# Patient Record
Sex: MALE | Race: OTHER | HISPANIC OR LATINO
[De-identification: names, ages, dates, MRNs, and addresses within clinical notes are randomized per-mention and may not be internally consistent; named-entity substitution may affect disease eponyms.]

---

## 2018-06-10 PROBLEM — Z00.00 ENCOUNTER FOR PREVENTIVE HEALTH EXAMINATION: Status: ACTIVE | Noted: 2018-06-10

## 2018-07-11 ENCOUNTER — APPOINTMENT (OUTPATIENT)
Dept: GASTROENTEROLOGY | Facility: CLINIC | Age: 58
End: 2018-07-11
Payer: MEDICAID

## 2018-07-11 VITALS
RESPIRATION RATE: 16 BRPM | HEIGHT: 64 IN | WEIGHT: 150 LBS | DIASTOLIC BLOOD PRESSURE: 85 MMHG | OXYGEN SATURATION: 96 % | BODY MASS INDEX: 25.61 KG/M2 | HEART RATE: 77 BPM | SYSTOLIC BLOOD PRESSURE: 118 MMHG

## 2018-07-11 DIAGNOSIS — L29.0 PRURITUS ANI: ICD-10-CM

## 2018-07-11 DIAGNOSIS — R12 HEARTBURN: ICD-10-CM

## 2018-07-11 PROCEDURE — 99214 OFFICE O/P EST MOD 30 MIN: CPT

## 2018-07-11 RX ORDER — OMEPRAZOLE 40 MG/1
40 CAPSULE, DELAYED RELEASE ORAL
Qty: 90 | Refills: 1 | Status: ACTIVE | COMMUNITY
Start: 2018-07-11 | End: 1900-01-01

## 2018-07-16 LAB
ALBUMIN SERPL ELPH-MCNC: 4.6 G/DL
ALP BLD-CCNC: 70 U/L
ALT SERPL-CCNC: 42 U/L
ANION GAP SERPL CALC-SCNC: 10 MMOL/L
AST SERPL-CCNC: 19 U/L
BILIRUB SERPL-MCNC: 0.9 MG/DL
BUN SERPL-MCNC: 22 MG/DL
CALCIUM SERPL-MCNC: 9.6 MG/DL
CHLORIDE SERPL-SCNC: 103 MMOL/L
CO2 SERPL-SCNC: 26 MMOL/L
CREAT SERPL-MCNC: 0.95 MG/DL
GLUCOSE SERPL-MCNC: 109 MG/DL
POTASSIUM SERPL-SCNC: 4.7 MMOL/L
PROT SERPL-MCNC: 7.4 G/DL
SODIUM SERPL-SCNC: 139 MMOL/L

## 2018-07-17 LAB
BASOPHILS # BLD AUTO: 0.01 K/UL
BASOPHILS NFR BLD AUTO: 0.2 %
EOSINOPHIL # BLD AUTO: 0.11 K/UL
EOSINOPHIL NFR BLD AUTO: 2.2 %
HCT VFR BLD CALC: 43.1 %
HGB BLD-MCNC: 14.9 G/DL
IMM GRANULOCYTES NFR BLD AUTO: 0.2 %
INR PPP: 0.93 RATIO
LYMPHOCYTES # BLD AUTO: 2.02 K/UL
LYMPHOCYTES NFR BLD AUTO: 40.9 %
MAN DIFF?: NORMAL
MCHC RBC-ENTMCNC: 32.4 PG
MCHC RBC-ENTMCNC: 34.6 GM/DL
MCV RBC AUTO: 93.7 FL
MONOCYTES # BLD AUTO: 0.44 K/UL
MONOCYTES NFR BLD AUTO: 8.9 %
NEUTROPHILS # BLD AUTO: 2.35 K/UL
NEUTROPHILS NFR BLD AUTO: 47.6 %
PLATELET # BLD AUTO: 157 K/UL
PT BLD: 10.5 SEC
RBC # BLD: 4.6 M/UL
RBC # FLD: 14.4 %
WBC # FLD AUTO: 4.94 K/UL

## 2018-09-13 ENCOUNTER — RESULT REVIEW (OUTPATIENT)
Age: 58
End: 2018-09-13

## 2018-09-13 ENCOUNTER — OUTPATIENT (OUTPATIENT)
Dept: OUTPATIENT SERVICES | Facility: HOSPITAL | Age: 58
LOS: 1 days | End: 2018-09-13
Payer: MEDICAID

## 2018-09-13 ENCOUNTER — APPOINTMENT (OUTPATIENT)
Dept: GASTROENTEROLOGY | Facility: GI CENTER | Age: 58
End: 2018-09-13
Payer: MEDICAID

## 2018-09-13 DIAGNOSIS — Z86.19 PERSONAL HISTORY OF OTHER INFECTIOUS AND PARASITIC DISEASES: ICD-10-CM

## 2018-09-13 DIAGNOSIS — R12 HEARTBURN: ICD-10-CM

## 2018-09-13 DIAGNOSIS — K22.70 BARRETT'S ESOPHAGUS W/OUT DYSPLASIA: ICD-10-CM

## 2018-09-13 PROCEDURE — 43239 EGD BIOPSY SINGLE/MULTIPLE: CPT

## 2018-09-13 PROCEDURE — 88305 TISSUE EXAM BY PATHOLOGIST: CPT

## 2018-09-13 PROCEDURE — T1013: CPT

## 2018-09-13 PROCEDURE — 88342 IMHCHEM/IMCYTCHM 1ST ANTB: CPT

## 2018-09-13 PROCEDURE — 88305 TISSUE EXAM BY PATHOLOGIST: CPT | Mod: 26

## 2018-09-13 PROCEDURE — 88342 IMHCHEM/IMCYTCHM 1ST ANTB: CPT | Mod: 26

## 2018-09-18 LAB — SURGICAL PATHOLOGY FINAL REPORT - CH: SIGNIFICANT CHANGE UP

## 2018-10-02 ENCOUNTER — RESULT REVIEW (OUTPATIENT)
Age: 58
End: 2018-10-02

## 2019-02-12 ENCOUNTER — APPOINTMENT (OUTPATIENT)
Dept: SPINE | Facility: CLINIC | Age: 59
End: 2019-02-12
Payer: MEDICAID

## 2019-02-12 DIAGNOSIS — Z83.3 FAMILY HISTORY OF DIABETES MELLITUS: ICD-10-CM

## 2019-02-12 PROCEDURE — 99203 OFFICE O/P NEW LOW 30 MIN: CPT

## 2019-02-15 VITALS — BODY MASS INDEX: 25.61 KG/M2 | HEIGHT: 64 IN | WEIGHT: 150 LBS

## 2019-02-15 PROBLEM — Z83.3 FAMILY HISTORY OF DIABETES MELLITUS: Status: ACTIVE | Noted: 2019-02-15

## 2019-02-15 NOTE — ASSESSMENT
[FreeTextEntry1] : Assess\par REcurrent cervical radiculopathy\par S/P ACDF 2011\par Now with C 8 nerve root pain \par \par PLAN:\par CT myelogram\par Follow up after images completed

## 2019-02-15 NOTE — REVIEW OF SYSTEMS
[Arm Weakness] : arm weakness [Hand Weakness] :  hand weakness [Joint Pain] : joint pain [Itching] : itching [Negative] : Heme/Lymph [de-identified] : neck and arm pain  [FreeTextEntry3] : blurry vision

## 2019-02-15 NOTE — HISTORY OF PRESENT ILLNESS
[FreeTextEntry1] : Neck pain  [de-identified] : This is a 58 year old male who had an ACDF in 2011 and presents now with neck pain  and right arm pain.  The pain started January 4, 2019 and her  reports pain in his right arm and numb finger tips.  The pain is a 4/10.  His shoulders are also involved and the  shoulder pain is explained as a burning sensation.

## 2019-02-15 NOTE — HISTORY OF PRESENT ILLNESS
[FreeTextEntry1] : Neck pain  [de-identified] : This is a 58 year old male who had an ACDF in 2011 and presents now with neck pain  and right arm pain.  The pain started January 4, 2019 and her  reports pain in his right arm and numb finger tips.  The pain is a 4/10.  His shoulders are also involved and the  shoulder pain is explained as a burning sensation.

## 2019-02-15 NOTE — REASON FOR VISIT
[New Patient Visit] : a new patient visit [Other: _____] : [unfilled] [FreeTextEntry1] : Recurrent cervical radiculopathy

## 2019-02-25 ENCOUNTER — OTHER (OUTPATIENT)
Age: 59
End: 2019-02-25

## 2019-02-26 ENCOUNTER — APPOINTMENT (OUTPATIENT)
Dept: SPINE | Facility: CLINIC | Age: 59
End: 2019-02-26

## 2019-02-28 ENCOUNTER — OUTPATIENT (OUTPATIENT)
Dept: OUTPATIENT SERVICES | Facility: HOSPITAL | Age: 59
LOS: 1 days | End: 2019-02-28
Payer: MEDICAID

## 2019-02-28 ENCOUNTER — APPOINTMENT (OUTPATIENT)
Dept: RADIOLOGY | Facility: HOSPITAL | Age: 59
End: 2019-02-28

## 2019-02-28 DIAGNOSIS — M54.12 RADICULOPATHY, CERVICAL REGION: ICD-10-CM

## 2019-02-28 DIAGNOSIS — Z00.00 ENCOUNTER FOR GENERAL ADULT MEDICAL EXAMINATION WITHOUT ABNORMAL FINDINGS: ICD-10-CM

## 2019-02-28 PROCEDURE — 72126 CT NECK SPINE W/DYE: CPT | Mod: 26

## 2019-02-28 PROCEDURE — 72126 CT NECK SPINE W/DYE: CPT

## 2019-02-28 PROCEDURE — 62302 MYELOGRAPHY LUMBAR INJECTION: CPT

## 2019-03-12 ENCOUNTER — APPOINTMENT (OUTPATIENT)
Dept: SPINE | Facility: CLINIC | Age: 59
End: 2019-03-12
Payer: MEDICAID

## 2019-03-12 PROCEDURE — 99214 OFFICE O/P EST MOD 30 MIN: CPT

## 2019-03-14 VITALS
WEIGHT: 150 LBS | SYSTOLIC BLOOD PRESSURE: 150 MMHG | DIASTOLIC BLOOD PRESSURE: 80 MMHG | HEIGHT: 64 IN | BODY MASS INDEX: 25.61 KG/M2

## 2019-03-14 NOTE — ASSESSMENT
[FreeTextEntry1] : Assess:\par No fusion across prior ACDF intervention\par Neck and right arm pain  \par \par PLAN\par Redo ACDF \par Collaborate with Dr Cortés in OR\par Risks discussed \par

## 2019-03-14 NOTE — REASON FOR VISIT
[Follow-Up: _____] : a [unfilled] follow-up visit [FreeTextEntry1] : 58 year old with history of neck and right arm pain.  CT myelogram performed reveals no fusion from prior surgery and stenosis.

## 2019-03-28 ENCOUNTER — OUTPATIENT (OUTPATIENT)
Dept: OUTPATIENT SERVICES | Facility: HOSPITAL | Age: 59
LOS: 1 days | End: 2019-03-28
Payer: MEDICAID

## 2019-03-28 VITALS
SYSTOLIC BLOOD PRESSURE: 131 MMHG | DIASTOLIC BLOOD PRESSURE: 88 MMHG | HEIGHT: 64 IN | TEMPERATURE: 97 F | HEART RATE: 82 BPM | WEIGHT: 151.9 LBS | OXYGEN SATURATION: 97 % | RESPIRATION RATE: 13 BRPM

## 2019-03-28 DIAGNOSIS — Z01.818 ENCOUNTER FOR OTHER PREPROCEDURAL EXAMINATION: ICD-10-CM

## 2019-03-28 DIAGNOSIS — Z78.9 OTHER SPECIFIED HEALTH STATUS: ICD-10-CM

## 2019-03-28 DIAGNOSIS — M54.12 RADICULOPATHY, CERVICAL REGION: ICD-10-CM

## 2019-03-28 DIAGNOSIS — K21.9 GASTRO-ESOPHAGEAL REFLUX DISEASE WITHOUT ESOPHAGITIS: ICD-10-CM

## 2019-03-28 DIAGNOSIS — Z29.9 ENCOUNTER FOR PROPHYLACTIC MEASURES, UNSPECIFIED: ICD-10-CM

## 2019-03-28 DIAGNOSIS — Z98.890 OTHER SPECIFIED POSTPROCEDURAL STATES: Chronic | ICD-10-CM

## 2019-03-28 LAB
ANION GAP SERPL CALC-SCNC: 14 MMOL/L — SIGNIFICANT CHANGE UP (ref 5–17)
BLD GP AB SCN SERPL QL: NEGATIVE — SIGNIFICANT CHANGE UP
BUN SERPL-MCNC: 19 MG/DL — SIGNIFICANT CHANGE UP (ref 7–23)
CALCIUM SERPL-MCNC: 10 MG/DL — SIGNIFICANT CHANGE UP (ref 8.4–10.5)
CHLORIDE SERPL-SCNC: 101 MMOL/L — SIGNIFICANT CHANGE UP (ref 96–108)
CO2 SERPL-SCNC: 24 MMOL/L — SIGNIFICANT CHANGE UP (ref 22–31)
CREAT SERPL-MCNC: 0.81 MG/DL — SIGNIFICANT CHANGE UP (ref 0.5–1.3)
GLUCOSE SERPL-MCNC: 105 MG/DL — HIGH (ref 70–99)
HCT VFR BLD CALC: 45.1 % — SIGNIFICANT CHANGE UP (ref 39–50)
HGB BLD-MCNC: 15 G/DL — SIGNIFICANT CHANGE UP (ref 13–17)
MCHC RBC-ENTMCNC: 31.5 PG — SIGNIFICANT CHANGE UP (ref 27–34)
MCHC RBC-ENTMCNC: 33.3 GM/DL — SIGNIFICANT CHANGE UP (ref 32–36)
MCV RBC AUTO: 94.7 FL — SIGNIFICANT CHANGE UP (ref 80–100)
MRSA PCR RESULT.: SIGNIFICANT CHANGE UP
PLATELET # BLD AUTO: 168 K/UL — SIGNIFICANT CHANGE UP (ref 150–400)
POTASSIUM SERPL-MCNC: 4.2 MMOL/L — SIGNIFICANT CHANGE UP (ref 3.5–5.3)
POTASSIUM SERPL-SCNC: 4.2 MMOL/L — SIGNIFICANT CHANGE UP (ref 3.5–5.3)
RBC # BLD: 4.76 M/UL — SIGNIFICANT CHANGE UP (ref 4.2–5.8)
RBC # FLD: 12.9 % — SIGNIFICANT CHANGE UP (ref 10.3–14.5)
RH IG SCN BLD-IMP: POSITIVE — SIGNIFICANT CHANGE UP
S AUREUS DNA NOSE QL NAA+PROBE: SIGNIFICANT CHANGE UP
SODIUM SERPL-SCNC: 139 MMOL/L — SIGNIFICANT CHANGE UP (ref 135–145)
WBC # BLD: 5.29 K/UL — SIGNIFICANT CHANGE UP (ref 3.8–10.5)
WBC # FLD AUTO: 5.29 K/UL — SIGNIFICANT CHANGE UP (ref 3.8–10.5)

## 2019-03-28 PROCEDURE — 86901 BLOOD TYPING SEROLOGIC RH(D): CPT

## 2019-03-28 PROCEDURE — 85027 COMPLETE CBC AUTOMATED: CPT

## 2019-03-28 PROCEDURE — G0463: CPT

## 2019-03-28 PROCEDURE — 86900 BLOOD TYPING SEROLOGIC ABO: CPT

## 2019-03-28 PROCEDURE — 87641 MR-STAPH DNA AMP PROBE: CPT

## 2019-03-28 PROCEDURE — 80048 BASIC METABOLIC PNL TOTAL CA: CPT

## 2019-03-28 PROCEDURE — 86850 RBC ANTIBODY SCREEN: CPT

## 2019-03-28 PROCEDURE — 87640 STAPH A DNA AMP PROBE: CPT

## 2019-03-28 RX ORDER — CEFAZOLIN SODIUM 1 G
2000 VIAL (EA) INJECTION ONCE
Qty: 0 | Refills: 0 | Status: DISCONTINUED | OUTPATIENT
Start: 2019-04-11 | End: 2019-04-14

## 2019-03-28 NOTE — H&P PST ADULT - NSANTHOSAYNRD_GEN_A_CORE
No. OCTAVIANO screening performed.  STOP BANG Legend: 0-2 = LOW Risk; 3-4 = INTERMEDIATE Risk; 5-8 = HIGH Risk

## 2019-03-28 NOTE — H&P PST ADULT - NSICDXPASTMEDICALHX_GEN_ALL_CORE_FT
PAST MEDICAL HISTORY:  Generalized headaches tension vs migraine, last one 3/25/19    GERD without esophagitis controlled with diet and omeprazole    Language barrier to communication Sinhala speaking, will need     Osteoarthritis     Pre-diabetes as per patient

## 2019-03-28 NOTE — H&P PST ADULT - HISTORY OF PRESENT ILLNESS
Mr. Honeycutt is a 58 year old Icelandic speaking man with PMH GERD, osteoarthritis and previous cervical neck surgery in 2011 who has cervical neck pain again and scheduled for C5 Corpectomy, C4-7 anterior instrumentation and fusion.  He c/o pain in neck which can reach 10/10 with activity 4/10 at rest and treats pain with Icy Hot and Etodolac.

## 2019-03-28 NOTE — H&P PST ADULT - NSICDXPROBLEM_GEN_ALL_CORE_FT
PROBLEM DIAGNOSES  Problem: Cervical radiculopathy  Assessment and Plan: Scheduled for C5 Corpectomy, C47 anterior instrumentation and fusion  Preop instructions given.  Labs pending, incl. swab for MRSA/MSSA    Problem: Language barrier to communication  Assessment and Plan: Recommend , [place].    Problem: GERD without esophagitis  Assessment and Plan: Instructed that he continue omeprazole and to take DOS with small sips of water.    Problem: Prophylactic measure  Assessment and Plan: The Caprini score indicates this patient is at risk for a VTE event (score 3-5).  Most surgical patients in this group would benefit from pharmacologic prophylaxis.  The surgical team will determine the balance between VTE risk and bleeding risk

## 2019-03-28 NOTE — H&P PST ADULT - ASSESSMENT
CAPRINI SCORE [CLOT]    AGE RELATED RISK FACTORS                                                       MOBILITY RELATED FACTORS  [x ] Age 41-60 years                                            (1 Point)                  [ ] Bed rest                                                        (1 Point)  [ ] Age: 61-74 years                                           (2 Points)                 [ ] Plaster cast                                                   (2 Points)  [ ] Age= 75 years                                              (3 Points)                 [ ] Bed bound for more than 72 hours                 (2 Points)    DISEASE RELATED RISK FACTORS                                               GENDER SPECIFIC FACTORS  [ ] Edema in the lower extremities                       (1 Point)                  [ ] Pregnancy                                                     (1 Point)  [ ] Varicose veins                                               (1 Point)                  [ ] Post-partum < 6 weeks                                   (1 Point)             [x ] BMI > 25 Kg/m2                                            (1 Point)                  [ ] Hormonal therapy  or oral contraception          (1 Point)                 [ ] Sepsis (in the previous month)                        (1 Point)                  [ ] History of pregnancy complications                 (1 point)  [ ] Pneumonia or serious lung disease                                               [ ] Unexplained or recurrent                     (1 Point)           (in the previous month)                               (1 Point)  [ ] Abnormal pulmonary function test                     (1 Point)                 SURGERY RELATED RISK FACTORS  [ ] Acute myocardial infarction                              (1 Point)                 [ ]  Section                                             (1 Point)  [ ] Congestive heart failure (in the previous month)  (1 Point)               [ ] Minor surgery                                                  (1 Point)   [ ] Inflammatory bowel disease                             (1 Point)                 [ ] Arthroscopic surgery                                        (2 Points)  [ ] Central venous access                                      (2 Points)                [ x] General surgery lasting more than 45 minutes   (2 Points)       [ ] Stroke (in the previous month)                          (5 Points)               [ ] Elective arthroplasty                                         (5 Points)                                                                                                                                               HEMATOLOGY RELATED FACTORS                                                 TRAUMA RELATED RISK FACTORS  [ ] Prior episodes of VTE                                     (3 Points)                 [ ] Fracture of the hip, pelvis, or leg                       (5 Points)  [ ] Positive family history for VTE                         (3 Points)                 [ ] Acute spinal cord injury (in the previous month)  (5 Points)  [ ] Prothrombin 24983 A                                     (3 Points)                 [ ] Paralysis  (less than 1 month)                             (5 Points)  [ ] Factor V Leiden                                             (3 Points)                  [ ] Multiple Trauma within 1 month                        (5 Points)  [ ] Lupus anticoagulants                                     (3 Points)                                                           [ ] Anticardiolipin antibodies                               (3 Points)                                                       [ ] High homocysteine in the blood                      (3 Points)                                             [ ] Other congenital or acquired thrombophilia      (3 Points)                                                [ ] Heparin induced thrombocytopenia                  (3 Points)                                          Total Score [    4      ]

## 2019-03-29 PROBLEM — R51 HEADACHE: Chronic | Status: ACTIVE | Noted: 2019-03-28

## 2019-03-29 PROBLEM — K21.9 GASTRO-ESOPHAGEAL REFLUX DISEASE WITHOUT ESOPHAGITIS: Chronic | Status: ACTIVE | Noted: 2019-03-28

## 2019-03-29 PROBLEM — Z78.9 OTHER SPECIFIED HEALTH STATUS: Chronic | Status: ACTIVE | Noted: 2019-03-28

## 2019-03-29 PROBLEM — R73.03 PREDIABETES: Chronic | Status: ACTIVE | Noted: 2019-03-28

## 2019-04-03 PROBLEM — M19.90 UNSPECIFIED OSTEOARTHRITIS, UNSPECIFIED SITE: Chronic | Status: ACTIVE | Noted: 2019-03-28

## 2019-04-10 ENCOUNTER — TRANSCRIPTION ENCOUNTER (OUTPATIENT)
Age: 59
End: 2019-04-10

## 2019-04-11 ENCOUNTER — APPOINTMENT (OUTPATIENT)
Dept: SPINE | Facility: HOSPITAL | Age: 59
End: 2019-04-11
Payer: MEDICAID

## 2019-04-11 ENCOUNTER — INPATIENT (INPATIENT)
Facility: HOSPITAL | Age: 59
LOS: 2 days | Discharge: HOME | DRG: 471 | End: 2019-04-14
Attending: NEUROLOGICAL SURGERY | Admitting: NEUROLOGICAL SURGERY
Payer: MEDICAID

## 2019-04-11 ENCOUNTER — APPOINTMENT (OUTPATIENT)
Dept: OTOLARYNGOLOGY | Facility: HOSPITAL | Age: 59
End: 2019-04-11

## 2019-04-11 VITALS
OXYGEN SATURATION: 96 % | RESPIRATION RATE: 16 BRPM | WEIGHT: 151.9 LBS | DIASTOLIC BLOOD PRESSURE: 75 MMHG | TEMPERATURE: 98 F | HEIGHT: 64 IN | SYSTOLIC BLOOD PRESSURE: 111 MMHG | HEART RATE: 81 BPM

## 2019-04-11 DIAGNOSIS — M54.12 RADICULOPATHY, CERVICAL REGION: ICD-10-CM

## 2019-04-11 DIAGNOSIS — Z98.1 ARTHRODESIS STATUS: ICD-10-CM

## 2019-04-11 DIAGNOSIS — Z98.890 OTHER SPECIFIED POSTPROCEDURAL STATES: Chronic | ICD-10-CM

## 2019-04-11 LAB
ANION GAP SERPL CALC-SCNC: 13 MMOL/L — SIGNIFICANT CHANGE UP (ref 5–17)
BASOPHILS # BLD AUTO: 0 K/UL — SIGNIFICANT CHANGE UP (ref 0–0.2)
BASOPHILS NFR BLD AUTO: 0.1 % — SIGNIFICANT CHANGE UP (ref 0–2)
BUN SERPL-MCNC: 14 MG/DL — SIGNIFICANT CHANGE UP (ref 7–23)
CALCIUM SERPL-MCNC: 8.2 MG/DL — LOW (ref 8.4–10.5)
CHLORIDE SERPL-SCNC: 102 MMOL/L — SIGNIFICANT CHANGE UP (ref 96–108)
CO2 SERPL-SCNC: 23 MMOL/L — SIGNIFICANT CHANGE UP (ref 22–31)
CREAT SERPL-MCNC: 0.71 MG/DL — SIGNIFICANT CHANGE UP (ref 0.5–1.3)
EOSINOPHIL # BLD AUTO: 0.1 K/UL — SIGNIFICANT CHANGE UP (ref 0–0.5)
EOSINOPHIL NFR BLD AUTO: 1.6 % — SIGNIFICANT CHANGE UP (ref 0–6)
GLUCOSE SERPL-MCNC: 91 MG/DL — SIGNIFICANT CHANGE UP (ref 70–99)
HCT VFR BLD CALC: 40 % — SIGNIFICANT CHANGE UP (ref 39–50)
HGB BLD-MCNC: 13.6 G/DL — SIGNIFICANT CHANGE UP (ref 13–17)
LYMPHOCYTES # BLD AUTO: 2 K/UL — SIGNIFICANT CHANGE UP (ref 1–3.3)
LYMPHOCYTES # BLD AUTO: 29.7 % — SIGNIFICANT CHANGE UP (ref 13–44)
MCHC RBC-ENTMCNC: 32.9 PG — SIGNIFICANT CHANGE UP (ref 27–34)
MCHC RBC-ENTMCNC: 34 GM/DL — SIGNIFICANT CHANGE UP (ref 32–36)
MCV RBC AUTO: 97 FL — SIGNIFICANT CHANGE UP (ref 80–100)
MONOCYTES # BLD AUTO: 0.6 K/UL — SIGNIFICANT CHANGE UP (ref 0–0.9)
MONOCYTES NFR BLD AUTO: 8.8 % — SIGNIFICANT CHANGE UP (ref 2–14)
NEUTROPHILS # BLD AUTO: 3.9 K/UL — SIGNIFICANT CHANGE UP (ref 1.8–7.4)
NEUTROPHILS NFR BLD AUTO: 59.8 % — SIGNIFICANT CHANGE UP (ref 43–77)
PLATELET # BLD AUTO: 162 K/UL — SIGNIFICANT CHANGE UP (ref 150–400)
POTASSIUM SERPL-MCNC: 4.1 MMOL/L — SIGNIFICANT CHANGE UP (ref 3.5–5.3)
POTASSIUM SERPL-SCNC: 4.1 MMOL/L — SIGNIFICANT CHANGE UP (ref 3.5–5.3)
RBC # BLD: 4.12 M/UL — LOW (ref 4.2–5.8)
RBC # FLD: 12.2 % — SIGNIFICANT CHANGE UP (ref 10.3–14.5)
RH IG SCN BLD-IMP: POSITIVE — SIGNIFICANT CHANGE UP
SODIUM SERPL-SCNC: 138 MMOL/L — SIGNIFICANT CHANGE UP (ref 135–145)
WBC # BLD: 6.6 K/UL — SIGNIFICANT CHANGE UP (ref 3.8–10.5)
WBC # FLD AUTO: 6.6 K/UL — SIGNIFICANT CHANGE UP (ref 3.8–10.5)

## 2019-04-11 PROCEDURE — 22830 EXPLORATION OF SPINAL FUSION: CPT | Mod: 59

## 2019-04-11 PROCEDURE — 63081 REMOVE VERT BODY DCMPRN CRVL: CPT | Mod: 62

## 2019-04-11 PROCEDURE — 22854 INSJ BIOMECHANICAL DEVICE: CPT

## 2019-04-11 PROCEDURE — 22554 ARTHRD ANT NTRBD MIN DSC CRV: CPT

## 2019-04-11 PROCEDURE — 22845 INSERT SPINE FIXATION DEVICE: CPT | Mod: 59

## 2019-04-11 PROCEDURE — 22585 ARTHRD ANT NTRBD MIN DSC EA: CPT

## 2019-04-11 RX ORDER — HYDROMORPHONE HYDROCHLORIDE 2 MG/ML
0.5 INJECTION INTRAMUSCULAR; INTRAVENOUS; SUBCUTANEOUS
Qty: 0 | Refills: 0 | Status: DISCONTINUED | OUTPATIENT
Start: 2019-04-11 | End: 2019-04-11

## 2019-04-11 RX ORDER — OMEPRAZOLE 10 MG/1
1 CAPSULE, DELAYED RELEASE ORAL
Qty: 0 | Refills: 0 | COMMUNITY

## 2019-04-11 RX ORDER — LIDOCAINE HCL 20 MG/ML
0.2 VIAL (ML) INJECTION ONCE
Qty: 0 | Refills: 0 | Status: DISCONTINUED | OUTPATIENT
Start: 2019-04-11 | End: 2019-04-11

## 2019-04-11 RX ORDER — PANTOPRAZOLE SODIUM 20 MG/1
40 TABLET, DELAYED RELEASE ORAL
Qty: 0 | Refills: 0 | Status: DISCONTINUED | OUTPATIENT
Start: 2019-04-11 | End: 2019-04-14

## 2019-04-11 RX ORDER — CEFAZOLIN SODIUM 1 G
2000 VIAL (EA) INJECTION EVERY 8 HOURS
Qty: 0 | Refills: 0 | Status: COMPLETED | OUTPATIENT
Start: 2019-04-11 | End: 2019-04-12

## 2019-04-11 RX ORDER — SENNA PLUS 8.6 MG/1
2 TABLET ORAL AT BEDTIME
Qty: 0 | Refills: 0 | Status: DISCONTINUED | OUTPATIENT
Start: 2019-04-11 | End: 2019-04-14

## 2019-04-11 RX ORDER — OXYCODONE HYDROCHLORIDE 5 MG/1
5 TABLET ORAL EVERY 4 HOURS
Qty: 0 | Refills: 0 | Status: DISCONTINUED | OUTPATIENT
Start: 2019-04-11 | End: 2019-04-14

## 2019-04-11 RX ORDER — DEXAMETHASONE 0.5 MG/5ML
4 ELIXIR ORAL EVERY 6 HOURS
Qty: 0 | Refills: 0 | Status: COMPLETED | OUTPATIENT
Start: 2019-04-11 | End: 2019-04-12

## 2019-04-11 RX ORDER — ONDANSETRON 8 MG/1
4 TABLET, FILM COATED ORAL ONCE
Qty: 0 | Refills: 0 | Status: DISCONTINUED | OUTPATIENT
Start: 2019-04-11 | End: 2019-04-11

## 2019-04-11 RX ORDER — LACTOBACILLUS ACIDOPHILUS 100MM CELL
1 CAPSULE ORAL DAILY
Qty: 0 | Refills: 0 | Status: DISCONTINUED | OUTPATIENT
Start: 2019-04-11 | End: 2019-04-14

## 2019-04-11 RX ORDER — SODIUM CHLORIDE 9 MG/ML
1000 INJECTION, SOLUTION INTRAVENOUS
Qty: 0 | Refills: 0 | Status: DISCONTINUED | OUTPATIENT
Start: 2019-04-11 | End: 2019-04-12

## 2019-04-11 RX ORDER — DOCUSATE SODIUM 100 MG
100 CAPSULE ORAL THREE TIMES A DAY
Qty: 0 | Refills: 0 | Status: DISCONTINUED | OUTPATIENT
Start: 2019-04-11 | End: 2019-04-14

## 2019-04-11 RX ORDER — MAGNESIUM HYDROXIDE 400 MG/1
30 TABLET, CHEWABLE ORAL EVERY 12 HOURS
Qty: 0 | Refills: 0 | Status: DISCONTINUED | OUTPATIENT
Start: 2019-04-11 | End: 2019-04-14

## 2019-04-11 RX ORDER — SODIUM CHLORIDE 9 MG/ML
3 INJECTION INTRAMUSCULAR; INTRAVENOUS; SUBCUTANEOUS EVERY 8 HOURS
Qty: 0 | Refills: 0 | Status: DISCONTINUED | OUTPATIENT
Start: 2019-04-11 | End: 2019-04-11

## 2019-04-11 RX ORDER — ONDANSETRON 8 MG/1
4 TABLET, FILM COATED ORAL EVERY 4 HOURS
Qty: 0 | Refills: 0 | Status: DISCONTINUED | OUTPATIENT
Start: 2019-04-11 | End: 2019-04-14

## 2019-04-11 RX ORDER — ACETAMINOPHEN 500 MG
1000 TABLET ORAL ONCE
Qty: 0 | Refills: 0 | Status: COMPLETED | OUTPATIENT
Start: 2019-04-11 | End: 2019-04-12

## 2019-04-11 RX ORDER — METHOCARBAMOL 500 MG/1
750 TABLET, FILM COATED ORAL EVERY 6 HOURS
Qty: 0 | Refills: 0 | Status: DISCONTINUED | OUTPATIENT
Start: 2019-04-11 | End: 2019-04-14

## 2019-04-11 RX ADMIN — OXYCODONE HYDROCHLORIDE 5 MILLIGRAM(S): 5 TABLET ORAL at 20:05

## 2019-04-11 RX ADMIN — SODIUM CHLORIDE 75 MILLILITER(S): 9 INJECTION, SOLUTION INTRAVENOUS at 21:09

## 2019-04-11 RX ADMIN — Medication 100 MILLIGRAM(S): at 21:09

## 2019-04-11 RX ADMIN — Medication 4 MILLIGRAM(S): at 17:55

## 2019-04-11 RX ADMIN — SODIUM CHLORIDE 3 MILLILITER(S): 9 INJECTION INTRAMUSCULAR; INTRAVENOUS; SUBCUTANEOUS at 09:59

## 2019-04-11 RX ADMIN — Medication 4 MILLIGRAM(S): at 23:54

## 2019-04-11 RX ADMIN — OXYCODONE HYDROCHLORIDE 5 MILLIGRAM(S): 5 TABLET ORAL at 18:58

## 2019-04-11 RX ADMIN — SENNA PLUS 2 TABLET(S): 8.6 TABLET ORAL at 21:10

## 2019-04-11 NOTE — PROGRESS NOTE ADULT - ASSESSMENT
59 yo male s/p ACDF POD0 c/o nausea. Otherwise no concern for voice change, hematoma, difficulty breathing

## 2019-04-11 NOTE — PROGRESS NOTE ADULT - SUBJECTIVE AND OBJECTIVE BOX
ENT ISSUE/POD: s/p ACDF    HPI: 59 yo Azeri speaking male w daughter at bedside s/p ACDF seen and examined postoperatively. Pt c/o nausea, otherwise denies change in voice quality, difficulty breathing, uncontrolled pain.     PAST MEDICAL & SURGICAL HISTORY:  Pre-diabetes: as per patient  Generalized headaches: tension vs migraine, last one 3/25/19  GERD without esophagitis: controlled with diet and omeprazole  Osteoarthritis  Language barrier to communication: Setswana speaking, will need   H/O neck surgery: 2011, herniated discs    Allergies    No Known Allergies    Intolerances      MEDICATIONS  (STANDING):  acetaminophen  IVPB .. 1000 milliGRAM(s) IV Intermittent once  ceFAZolin   IVPB 2000 milliGRAM(s) IV Intermittent every 8 hours  ceFAZolin   IVPB 2000 milliGRAM(s) IV Intermittent once  dexamethasone  Injectable 4 milliGRAM(s) IV Push every 6 hours  docusate sodium 100 milliGRAM(s) Oral three times a day  lactated ringers. 1000 milliLiter(s) (75 mL/Hr) IV Continuous <Continuous>  lactobacillus acidophilus 1 Tablet(s) Oral daily  multivitamin 1 Tablet(s) Oral daily  pantoprazole    Tablet 40 milliGRAM(s) Oral before breakfast  senna 2 Tablet(s) Oral at bedtime    MEDICATIONS  (PRN):  HYDROmorphone  Injectable 0.5 milliGRAM(s) IV Push every 10 minutes PRN Moderate Pain (4 - 6)  magnesium hydroxide Suspension 30 milliLiter(s) Oral every 12 hours PRN Constipation  methocarbamol 750 milliGRAM(s) Oral every 6 hours PRN Back Spasms  ondansetron   Disintegrating Tablet 4 milliGRAM(s) Oral every 4 hours PRN Nausea  ondansetron Injectable 4 milliGRAM(s) IV Push once PRN Nausea and/or Vomiting  oxyCODONE    IR 5 milliGRAM(s) Oral every 4 hours PRN Moderate Pain (4 - 6)        ROS:   ENT: all negative except as noted in HPI   Pulm: denies SOB, cough, hemoptysis  Neuro: denies numbness/tingling, loss of sensation  Endo: denies heat/cold intolerance, excessive sweating      Vital Signs Last 24 Hrs  T(C): 36.1 (11 Apr 2019 20:00), Max: 36.8 (11 Apr 2019 09:51)  T(F): 97 (11 Apr 2019 20:00), Max: 98.2 (11 Apr 2019 09:51)  HR: 79 (11 Apr 2019 20:00) (79 - 91)  BP: 108/67 (11 Apr 2019 20:00) (108/67 - 129/86)  BP(mean): 83 (11 Apr 2019 20:00) (83 - 103)  RR: 15 (11 Apr 2019 20:00) (12 - 18)  SpO2: 95% (11 Apr 2019 20:00) (94% - 100%)                          13.6   6.6   )-----------( 162      ( 11 Apr 2019 16:11 )             40.0    04-11    138  |  102  |  14  ----------------------------<  91  4.1   |  23  |  0.71    Ca    8.2<L>      11 Apr 2019 16:11         PHYSICAL EXAM:  Gen: NAD, OOB in chair  Skin: No rashes, bruises, or lesions  Head: Normocephalic, Atraumatic  Face: no edema, erythema, or fluctuance. Parotid glands soft without mass  Eyes: no scleral injection  Nose: Nares bilaterally patent, no discharge  Mouth: No Stridor / Drooling / Trismus.  Mucosa moist, tongue/uvula midline, oropharynx clear  Neck: Flat, supple, no lymphadenopathy, trachea midline, no masses, Ccollar in place, gauze dressing c/d, accordian drain with adequate serosanguinous output  Lymphatic: No lymphadenopathy  Resp: no stridor  Neuro: facial nerve intact, no facial droop

## 2019-04-11 NOTE — PATIENT PROFILE ADULT - FALL HARM RISK
surgery/coagulation(Bleeding disorder R/T clinical cond/anti-coags)/bones(Osteoporosis,prev fx,steroid use,metastatic bone ca)

## 2019-04-11 NOTE — PROGRESS NOTE ADULT - PROBLEM SELECTOR PLAN 1
Monitor for change in voice quality  Monitor for hematoma  Monitor drain output  Advance diet as tolerated  Pain control prn  ENT will continue to follow

## 2019-04-11 NOTE — PHYSICAL THERAPY INITIAL EVALUATION ADULT - PERTINENT HX OF CURRENT PROBLEM, REHAB EVAL
Mr. Honeycutt is a 58 year old Icelandic speaking man with PMH GERD, osteoarthritis and previous cervical neck surgery in 2011 who has cervical neck pain again and scheduled for C5 Corpectomy, C4-7 anterior instrumentation and fusion.  He c/o pain in neck which can reach 10/10 with activity 4/10 at rest

## 2019-04-11 NOTE — BRIEF OPERATIVE NOTE - OPERATION/FINDINGS
re-exploration of previous spinal fusion  removal of hardware   C5 corpectomy and C4-C7 anterior fusion

## 2019-04-11 NOTE — CHART NOTE - NSCHARTNOTEFT_GEN_A_CORE
CAPRINI SCORE [CLOT] Score on Admission for     AGE RELATED RISK FACTORS                                                       MOBILITY RELATED FACTORS  [x ] Age 41-60 years                                            (1 Point)                  [ ] Bed rest                                                        (1 Point)  [ ] Age: 61-74 years                                           (2 Points)                [ ] Plaster cast                                                   (2 Points)  [ ] Age= 75 years                                              (3 Points)                  [ ] Bed bound for more than 72 hours         (2 Points)    DISEASE RELATED RISK FACTORS                                               GENDER SPECIFIC FACTORS  [ ] Edema in the lower extremities                       (1 Point)           [ ] Pregnancy                                                          (1 Point)  [ ] Varicose veins                                               (1 Point)                  [ ] Post-partum < 6 weeks                                   (1 Point)             [ x] BMI > 25 Kg/m2                                            (1 Point)                  [ ] Hormonal therapy  or oral contraception   (1 Point)                 [ ] Sepsis (in the previous month)                        (1 Point)            [ ] History of pregnancy complications             (1 point)  [ ] Pneumonia or serious lung disease                                            [ ] Unexplained or recurrent               (1 Point)           (in the previous month)                               (1 Point)  [ ] Abnormal pulmonary function test                     (1 Point)                 SURGERY RELATED RISK FACTORS (include planned surgeries)  [ ] Acute myocardial infarction                              (1 Point)                 [ ]  Section                                             (1 Point)  [ ] Congestive heart failure (in the previous month)  (1 Point)        [ ] Minor surgery                                                  (1 Point)   [ ] Inflammatory bowel disease                             (1 Point)                 [ ] Arthroscopic surgery                                        (2 Points)  [ ] Central venous access                                      (2 Points)  [ ] History / presence of malignancy                   (2 points)   [ ] General surgery lasting more than 45 minutes   (2 Points)       [ ] Stroke (in the previous month)                          (5 Points)               [ ] Elective arthroplasty                                         (5 Points)                                                                                                                                               HEMATOLOGY RELATED FACTORS                                                 TRAUMA RELATED RISK FACTORS  [ ] Prior episodes of VTE                                     (3 Points)                [ ] Fracture of the hip, pelvis, or leg                       (5 Points)  [ ] Positive family history for VTE                         (3 Points)             [ ] Acute spinal cord injury (in the previous month)  (5 Points)  [ ] Prothrombin 87621 A                                     (3 Points)                [ ] Paralysis  (less than 1 month)                             (5 Points)  [ ] Factor V Leiden                                             (3 Points)                   [ ] Multiple Trauma within 1 month                        (5 Points)  [ ] Lupus anticoagulants                                     (3 Points)                                                           [ ] Anticardiolipin antibodies                               (3 Points)                                                       [ ] High homocysteine in the blood                      (3 Points)                                             [ ] Other congenital or acquired thrombophilia      (3 Points)                                                [ ] Heparin induced thrombocytopenia                  (3 Points)                                          Total Score [      2    ]    Risk:  Very low 0   Low 1 to 2   Moderate 3 to 4   High =5       VTE Prophylaxis Recommendations:  [ x] mechanical pneumatic compression devices                                      [ ] contraindicated: _____________________  [ ] chemoprophylaxis                                                                                    [ x] contraindicated bleeding risk_____________________    **** HIGH LIKELIHOOD DVT PRESENT ON ADMISSION  [ ] (please order LE dopplers within 24 hours of admission)

## 2019-04-11 NOTE — PHYSICAL THERAPY INITIAL EVALUATION ADULT - PLANNED THERAPY INTERVENTIONS, PT EVAL
transfer training/balance training/gait training/Pt will negotiate 1 flight of stairs indepenedently in 2 weeks./bed mobility training

## 2019-04-11 NOTE — PHYSICAL THERAPY INITIAL EVALUATION ADULT - GENERAL OBSERVATIONS, REHAB EVAL
pt encountered suipne in bed in PACU, + PIV, A line, + ICU monitoring, Indonesian speaking + No. + C collar donned .

## 2019-04-11 NOTE — PHYSICAL THERAPY INITIAL EVALUATION ADULT - CRITERIA FOR SKILLED THERAPEUTIC INTERVENTIONS
rehab potential/risk reduction/prevention/impairments found/anticipated discharge recommendation/functional limitations in following categories/therapy frequency/anticipated equipment needs at discharge/predicted duration of therapy intervention

## 2019-04-11 NOTE — PHYSICAL THERAPY INITIAL EVALUATION ADULT - DID THE PATIENT HAVE SURGERY?
yes/re-exploration of previous spinal fusion removal of hardware C5 corpectomy and C4-C7 anterior fusion

## 2019-04-11 NOTE — PHYSICAL THERAPY INITIAL EVALUATION ADULT - ADDITIONAL COMMENTS
as per pt: PTA pt was living in a PH + Stairs (few steps to enter, lives on first floor) and was independent in all functional mobility and ADL's. no AD for gait. lives with spouse and kids.

## 2019-04-12 LAB
ANION GAP SERPL CALC-SCNC: 13 MMOL/L — SIGNIFICANT CHANGE UP (ref 5–17)
BASOPHILS # BLD AUTO: 0 K/UL — SIGNIFICANT CHANGE UP (ref 0–0.2)
BASOPHILS NFR BLD AUTO: 0 % — SIGNIFICANT CHANGE UP (ref 0–2)
BUN SERPL-MCNC: 9 MG/DL — SIGNIFICANT CHANGE UP (ref 7–23)
CALCIUM SERPL-MCNC: 9 MG/DL — SIGNIFICANT CHANGE UP (ref 8.4–10.5)
CHLORIDE SERPL-SCNC: 103 MMOL/L — SIGNIFICANT CHANGE UP (ref 96–108)
CO2 SERPL-SCNC: 23 MMOL/L — SIGNIFICANT CHANGE UP (ref 22–31)
CREAT SERPL-MCNC: 0.65 MG/DL — SIGNIFICANT CHANGE UP (ref 0.5–1.3)
EOSINOPHIL # BLD AUTO: 0 K/UL — SIGNIFICANT CHANGE UP (ref 0–0.5)
EOSINOPHIL NFR BLD AUTO: 0 % — SIGNIFICANT CHANGE UP (ref 0–6)
GLUCOSE SERPL-MCNC: 147 MG/DL — HIGH (ref 70–99)
HCT VFR BLD CALC: 41.5 % — SIGNIFICANT CHANGE UP (ref 39–50)
HGB BLD-MCNC: 14 G/DL — SIGNIFICANT CHANGE UP (ref 13–17)
IMM GRANULOCYTES NFR BLD AUTO: 0.2 % — SIGNIFICANT CHANGE UP (ref 0–1.5)
LYMPHOCYTES # BLD AUTO: 0.77 K/UL — LOW (ref 1–3.3)
LYMPHOCYTES # BLD AUTO: 11.9 % — LOW (ref 13–44)
MCHC RBC-ENTMCNC: 31.1 PG — SIGNIFICANT CHANGE UP (ref 27–34)
MCHC RBC-ENTMCNC: 33.7 GM/DL — SIGNIFICANT CHANGE UP (ref 32–36)
MCV RBC AUTO: 92.2 FL — SIGNIFICANT CHANGE UP (ref 80–100)
MONOCYTES # BLD AUTO: 0.13 K/UL — SIGNIFICANT CHANGE UP (ref 0–0.9)
MONOCYTES NFR BLD AUTO: 2 % — SIGNIFICANT CHANGE UP (ref 2–14)
NEUTROPHILS # BLD AUTO: 5.54 K/UL — SIGNIFICANT CHANGE UP (ref 1.8–7.4)
NEUTROPHILS NFR BLD AUTO: 85.9 % — HIGH (ref 43–77)
PLATELET # BLD AUTO: 185 K/UL — SIGNIFICANT CHANGE UP (ref 150–400)
POTASSIUM SERPL-MCNC: 4.3 MMOL/L — SIGNIFICANT CHANGE UP (ref 3.5–5.3)
POTASSIUM SERPL-SCNC: 4.3 MMOL/L — SIGNIFICANT CHANGE UP (ref 3.5–5.3)
RBC # BLD: 4.5 M/UL — SIGNIFICANT CHANGE UP (ref 4.2–5.8)
RBC # FLD: 12.8 % — SIGNIFICANT CHANGE UP (ref 10.3–14.5)
SODIUM SERPL-SCNC: 139 MMOL/L — SIGNIFICANT CHANGE UP (ref 135–145)
WBC # BLD: 6.45 K/UL — SIGNIFICANT CHANGE UP (ref 3.8–10.5)
WBC # FLD AUTO: 6.45 K/UL — SIGNIFICANT CHANGE UP (ref 3.8–10.5)

## 2019-04-12 PROCEDURE — 99024 POSTOP FOLLOW-UP VISIT: CPT

## 2019-04-12 PROCEDURE — 72125 CT NECK SPINE W/O DYE: CPT | Mod: 26

## 2019-04-12 PROCEDURE — 71045 X-RAY EXAM CHEST 1 VIEW: CPT | Mod: 26

## 2019-04-12 PROCEDURE — 71275 CT ANGIOGRAPHY CHEST: CPT | Mod: 26

## 2019-04-12 RX ORDER — IPRATROPIUM/ALBUTEROL SULFATE 18-103MCG
3 AEROSOL WITH ADAPTER (GRAM) INHALATION EVERY 6 HOURS
Qty: 0 | Refills: 0 | Status: DISCONTINUED | OUTPATIENT
Start: 2019-04-12 | End: 2019-04-14

## 2019-04-12 RX ORDER — ENOXAPARIN SODIUM 100 MG/ML
40 INJECTION SUBCUTANEOUS AT BEDTIME
Qty: 0 | Refills: 0 | Status: DISCONTINUED | OUTPATIENT
Start: 2019-04-12 | End: 2019-04-14

## 2019-04-12 RX ORDER — ENOXAPARIN SODIUM 100 MG/ML
40 INJECTION SUBCUTANEOUS
Qty: 0 | Refills: 0 | Status: DISCONTINUED | OUTPATIENT
Start: 2019-04-12 | End: 2019-04-12

## 2019-04-12 RX ADMIN — Medication 400 MILLIGRAM(S): at 00:02

## 2019-04-12 RX ADMIN — Medication 1000 MILLIGRAM(S): at 01:02

## 2019-04-12 RX ADMIN — ENOXAPARIN SODIUM 40 MILLIGRAM(S): 100 INJECTION SUBCUTANEOUS at 21:49

## 2019-04-12 RX ADMIN — Medication 100 MILLIGRAM(S): at 06:03

## 2019-04-12 RX ADMIN — Medication 1 TABLET(S): at 13:36

## 2019-04-12 RX ADMIN — PANTOPRAZOLE SODIUM 40 MILLIGRAM(S): 20 TABLET, DELAYED RELEASE ORAL at 06:04

## 2019-04-12 RX ADMIN — Medication 4 MILLIGRAM(S): at 06:03

## 2019-04-12 RX ADMIN — Medication 4 MILLIGRAM(S): at 12:00

## 2019-04-12 RX ADMIN — Medication 1 TABLET(S): at 11:59

## 2019-04-12 NOTE — PROGRESS NOTE ADULT - SUBJECTIVE AND OBJECTIVE BOX
SUBJECTIVE:  Pt was seen and examined at bedside. Pt denies any chest pain, shortness of breath, abdominal pain, nausea, or vomiting. Cervical collar in place.     Vital Signs Last 24 Hrs  T(C): 37 (04-12-19 @ 04:26), Max: 37.2 (04-11-19 @ 22:37)  T(F): 98.6 (04-12-19 @ 04:26), Max: 98.9 (04-11-19 @ 22:37)  HR: 102 (04-12-19 @ 04:26) (79 - 102)  BP: 115/69 (04-12-19 @ 04:26) (108/67 - 129/86)  BP(mean): 87 (04-11-19 @ 21:00) (83 - 103)  RR: 16 (04-12-19 @ 04:26) (12 - 18)  SpO2: 98% (04-12-19 @ 04:26) (94% - 100%)    PHYSICAL EXAM:    Constitutional: No Acute Distress     Neurological: Awake, alert, oriented to person, place and time, speech clear and fluent, face equal, tongue midline, briskly following commands, no drift, moves all extremities with 5/5 strength, sensation intact to light touch throughout    Incision: Anterior neck dressing c/d/i    Pulmonary: Clear to Auscultation, No rales, No rhonchi, No wheezes     Cardiovascular: S1, S2, Regular rate and rhythm     Gastrointestinal: Soft, Non-tender, Non-distended, +bowel sounds x 4    Extremities: No calf tenderness bilaterally, no cyanosis, clubbing or edema      LABS:                          13.6   6.6   )-----------( 162      ( 11 Apr 2019 16:11 )             40.0    04-12    139  |  103  |  9   ----------------------------<  147<H>  4.3   |  23  |  0.65    Ca    9.0      12 Apr 2019 05:57        04-11 @ 07:01  -  04-12 @ 07:00  --------------------------------------------------------  IN: 225 mL / OUT: 2135 mL / NET: -1910 mL        IMAGING:     MEDICATIONS:  Antibiotics:  ceFAZolin   IVPB 2000 milliGRAM(s) IV Intermittent once    Neuro:  methocarbamol 750 milliGRAM(s) Oral every 6 hours PRN Back Spasms  ondansetron   Disintegrating Tablet 4 milliGRAM(s) Oral every 4 hours PRN Nausea  oxyCODONE    IR 5 milliGRAM(s) Oral every 4 hours PRN Moderate Pain (4 - 6)    Cardiac:    Pulm:    GI/:  docusate sodium 100 milliGRAM(s) Oral three times a day  magnesium hydroxide Suspension 30 milliLiter(s) Oral every 12 hours PRN Constipation  pantoprazole    Tablet 40 milliGRAM(s) Oral before breakfast  senna 2 Tablet(s) Oral at bedtime    Other:   dexamethasone  Injectable 4 milliGRAM(s) IV Push every 6 hours  lactated ringers. 1000 milliLiter(s) IV Continuous <Continuous>  lactobacillus acidophilus 1 Tablet(s) Oral daily  multivitamin 1 Tablet(s) Oral daily        DIET: Clear Liquid Diet SUBJECTIVE:  Pt was seen and examined at bedside. Pt c/o incisional pain, controlled with pain medication. Cervical collar in place. Pt denies any chest pain, shortness of breath, abdominal pain, nausea, or vomiting.     Vital Signs Last 24 Hrs  T(C): 37 (04-12-19 @ 04:26), Max: 37.2 (04-11-19 @ 22:37)  T(F): 98.6 (04-12-19 @ 04:26), Max: 98.9 (04-11-19 @ 22:37)  HR: 102 (04-12-19 @ 04:26) (79 - 102)  BP: 115/69 (04-12-19 @ 04:26) (108/67 - 129/86)  BP(mean): 87 (04-11-19 @ 21:00) (83 - 103)  RR: 16 (04-12-19 @ 04:26) (12 - 18)  SpO2: 98% (04-12-19 @ 04:26) (94% - 100%)    PHYSICAL EXAM:    Constitutional: No Acute Distress     Neurological: Awake, alert, oriented to person, place and time, speech clear and fluent, face equal, tongue midline, briskly following commands, no drift, moves all extremities with 5/5 strength, sensation intact to light touch throughout    Incision: Anterior neck dressing c/d/i    Drains: HMV serosanguinous (15 cc/24hour)    Pulmonary: Clear to Auscultation, No rales, No rhonchi, No wheezes     Cardiovascular: S1, S2, Regular rate and rhythm     Gastrointestinal: Soft, Non-tender, Non-distended, +bowel sounds x 4    Extremities: No calf tenderness bilaterally, no cyanosis, clubbing or edema      LABS:                                     14.0   6.45  )-----------( 185      ( 12 Apr 2019 09:06 )             41.5     139  |  103  |  9   ----------------------------<  147<H>  4.3   |  23  |  0.65    Ca    9.0      12 Apr 2019 05:57        04-11 @ 07:01  -  04-12 @ 07:00  --------------------------------------------------------  IN: 225 mL / OUT: 2135 mL / NET: -1910 mL        IMAGING:     MEDICATIONS:  Antibiotics:  ceFAZolin   IVPB 2000 milliGRAM(s) IV Intermittent once    Neuro:  methocarbamol 750 milliGRAM(s) Oral every 6 hours PRN Back Spasms  ondansetron   Disintegrating Tablet 4 milliGRAM(s) Oral every 4 hours PRN Nausea  oxyCODONE    IR 5 milliGRAM(s) Oral every 4 hours PRN Moderate Pain (4 - 6)    Cardiac:    Pulm:    GI/:  docusate sodium 100 milliGRAM(s) Oral three times a day  magnesium hydroxide Suspension 30 milliLiter(s) Oral every 12 hours PRN Constipation  pantoprazole    Tablet 40 milliGRAM(s) Oral before breakfast  senna 2 Tablet(s) Oral at bedtime    Other:   dexamethasone  Injectable 4 milliGRAM(s) IV Push every 6 hours  lactated ringers. 1000 milliLiter(s) IV Continuous <Continuous>  lactobacillus acidophilus 1 Tablet(s) Oral daily  multivitamin 1 Tablet(s) Oral daily        DIET: Clear Liquid Diet

## 2019-04-12 NOTE — PROVIDER CONTACT NOTE (OTHER) - ASSESSMENT
pt c/o bladder distention, pain pt c/o unable to urinate, bladder distention during palpation, pt on IVF running as ordered, pt states, " I have to pee but it doesn't come out", pt assisted to edge of bed and stood up to urinate but unable to urinate.

## 2019-04-12 NOTE — PROGRESS NOTE ADULT - ASSESSMENT
57 yo Malay speaking male w daughter at bedside s/p ACDF POD#1. Pt doing well post op,, tolerating PO diet.

## 2019-04-12 NOTE — PROGRESS NOTE ADULT - SUBJECTIVE AND OBJECTIVE BOX
HPI: 59 yo Latvian speaking male w daughter at bedside s/p ACDF POD#1. Pt seen and examined at bedside. No acute events overnight. Pt denies nausea, dizziness, hoarseness, difficulty breathing or swallowing, uncontrolled pain.     PAST MEDICAL & SURGICAL HISTORY:  Pre-diabetes: as per patient  Generalized headaches: tension vs migraine, last one 3/25/19  GERD without esophagitis: controlled with diet and omeprazole  Osteoarthritis  Language barrier to communication: Wolof speaking, will need   H/O neck surgery: 2011, herniated discs    Allergies: No Known Allergies      MEDICATIONS  (STANDING):  acetaminophen  IVPB .. 1000 milliGRAM(s) IV Intermittent once  ceFAZolin   IVPB 2000 milliGRAM(s) IV Intermittent every 8 hours  ceFAZolin   IVPB 2000 milliGRAM(s) IV Intermittent once  dexamethasone  Injectable 4 milliGRAM(s) IV Push every 6 hours  docusate sodium 100 milliGRAM(s) Oral three times a day  lactated ringers. 1000 milliLiter(s) (75 mL/Hr) IV Continuous <Continuous>  lactobacillus acidophilus 1 Tablet(s) Oral daily  multivitamin 1 Tablet(s) Oral daily  pantoprazole    Tablet 40 milliGRAM(s) Oral before breakfast  senna 2 Tablet(s) Oral at bedtime    MEDICATIONS  (PRN):  HYDROmorphone  Injectable 0.5 milliGRAM(s) IV Push every 10 minutes PRN Moderate Pain (4 - 6)  magnesium hydroxide Suspension 30 milliLiter(s) Oral every 12 hours PRN Constipation  methocarbamol 750 milliGRAM(s) Oral every 6 hours PRN Back Spasms  ondansetron   Disintegrating Tablet 4 milliGRAM(s) Oral every 4 hours PRN Nausea  ondansetron Injectable 4 milliGRAM(s) IV Push once PRN Nausea and/or Vomiting  oxyCODONE    IR 5 milliGRAM(s) Oral every 4 hours PRN Moderate Pain (4 - 6)        ROS:   ENT: all negative except as noted in HPI   Pulm: denies SOB, cough, hemoptysis  Neuro: denies numbness/tingling, loss of sensation  Endo: denies heat/cold intolerance, excessive sweating      Vital Signs Last 24 Hrs  T(C): 36.1 (11 Apr 2019 20:00), Max: 36.8 (11 Apr 2019 09:51)  T(F): 97 (11 Apr 2019 20:00), Max: 98.2 (11 Apr 2019 09:51)  HR: 79 (11 Apr 2019 20:00) (79 - 91)  BP: 108/67 (11 Apr 2019 20:00) (108/67 - 129/86)  BP(mean): 83 (11 Apr 2019 20:00) (83 - 103)  RR: 15 (11 Apr 2019 20:00) (12 - 18)  SpO2: 95% (11 Apr 2019 20:00) (94% - 100%)                          13.6   6.6   )-----------( 162      ( 11 Apr 2019 16:11 )             40.0    04-11    138  |  102  |  14  ----------------------------<  91  4.1   |  23  |  0.71    Ca    8.2<L>      11 Apr 2019 16:11         PHYSICAL EXAM:  Gen: NAD, OOB in chair  Skin: No rashes, bruises, or lesions  Head: Normocephalic, Atraumatic  Face: no edema, erythema, or fluctuance. Parotid glands soft without mass  Eyes: no scleral injection  Nose: Nares bilaterally patent, no discharge  Mouth: No Stridor / Drooling / Trismus.  Mucosa moist, tongue/uvula midline, oropharynx clear  Neck: Flat, supple, no lymphadenopathy, trachea midline, no masses, Ccollar in place, gauze dressing c/d, accordian drain with adequate serosanguinous output  Lymphatic: No lymphadenopathy  Resp: no stridor  Neuro: facial nerve intact, no facial droop HPI: 59 yo Tamazight speaking male w daughter at bedside s/p ACDF POD#1. Pt seen and examined at bedside. No acute events overnight. Pt complaining of some difficulty swallowing clear liquids.  Pt denies nausea, dizziness, hoarseness, difficulty breathing, uncontrolled pain.     PAST MEDICAL & SURGICAL HISTORY:  Pre-diabetes: as per patient  Generalized headaches: tension vs migraine, last one 3/25/19  GERD without esophagitis: controlled with diet and omeprazole  Osteoarthritis  Language barrier to communication: Palauan speaking, will need   H/O neck surgery: 2011, herniated discs    Allergies: No Known Allergies      MEDICATIONS  (STANDING):  acetaminophen  IVPB .. 1000 milliGRAM(s) IV Intermittent once  ceFAZolin   IVPB 2000 milliGRAM(s) IV Intermittent every 8 hours  ceFAZolin   IVPB 2000 milliGRAM(s) IV Intermittent once  dexamethasone  Injectable 4 milliGRAM(s) IV Push every 6 hours  docusate sodium 100 milliGRAM(s) Oral three times a day  lactated ringers. 1000 milliLiter(s) (75 mL/Hr) IV Continuous <Continuous>  lactobacillus acidophilus 1 Tablet(s) Oral daily  multivitamin 1 Tablet(s) Oral daily  pantoprazole    Tablet 40 milliGRAM(s) Oral before breakfast  senna 2 Tablet(s) Oral at bedtime    MEDICATIONS  (PRN):  HYDROmorphone  Injectable 0.5 milliGRAM(s) IV Push every 10 minutes PRN Moderate Pain (4 - 6)  magnesium hydroxide Suspension 30 milliLiter(s) Oral every 12 hours PRN Constipation  methocarbamol 750 milliGRAM(s) Oral every 6 hours PRN Back Spasms  ondansetron   Disintegrating Tablet 4 milliGRAM(s) Oral every 4 hours PRN Nausea  ondansetron Injectable 4 milliGRAM(s) IV Push once PRN Nausea and/or Vomiting  oxyCODONE    IR 5 milliGRAM(s) Oral every 4 hours PRN Moderate Pain (4 - 6)        ROS:   ENT: all negative except as noted in HPI   Pulm: denies SOB, cough, hemoptysis  Neuro: denies numbness/tingling, loss of sensation  Endo: denies heat/cold intolerance, excessive sweating      Vital Signs Last 24 Hrs  T(C): 36.1 (11 Apr 2019 20:00), Max: 36.8 (11 Apr 2019 09:51)  T(F): 97 (11 Apr 2019 20:00), Max: 98.2 (11 Apr 2019 09:51)  HR: 79 (11 Apr 2019 20:00) (79 - 91)  BP: 108/67 (11 Apr 2019 20:00) (108/67 - 129/86)  BP(mean): 83 (11 Apr 2019 20:00) (83 - 103)  RR: 15 (11 Apr 2019 20:00) (12 - 18)  SpO2: 95% (11 Apr 2019 20:00) (94% - 100%)                          13.6   6.6   )-----------( 162      ( 11 Apr 2019 16:11 )             40.0    04-11    138  |  102  |  14  ----------------------------<  91  4.1   |  23  |  0.71    Ca    8.2<L>      11 Apr 2019 16:11         PHYSICAL EXAM:  Gen: NAD, OOB in chair  Skin: No rashes, bruises, or lesions  Head: Normocephalic, Atraumatic  Face: no edema, erythema, or fluctuance. Parotid glands soft without mass  Eyes: no scleral injection  Nose: Nares bilaterally patent, no discharge  Mouth: No Stridor / Drooling / Trismus.  Mucosa moist, tongue/uvula midline, oropharynx clear  Neck: Flat, supple, no lymphadenopathy, trachea midline, no masses, Ccollar in place, gauze dressing c/d, accordion drain with adequate serosanguinous output.  Lymphatic: No lymphadenopathy  Resp: no stridor  Neuro: facial nerve intact, no facial droop

## 2019-04-12 NOTE — CHART NOTE - NSCHARTNOTEFT_GEN_A_CORE
Patient found to be hypoxic, O2 sat 89 % on Room Air noted on pulse ox, mild tachycardic up to (104 bpm). On physical exam, patient is acyanotic, lung sounds were clear to auscultation with no wheezing, rales, or rhonchi appreciated. CXR was ordered, awaiting official read but appears to be without acute findings.  Denies having chest pain, shortness of breath, cough, calf tenderness. CTA of Chest of w/ IV contrast pending to r/o pulmonary embolism. Placed on 2 L of O2 on nasal cannula, for O2 Sat >94%.

## 2019-04-12 NOTE — PROVIDER CONTACT NOTE (OTHER) - RECOMMENDATIONS
MD Prosper Vila made aware, MD recommends straight cath MD Prosper Vila made aware, RN recommends straight cath.

## 2019-04-12 NOTE — PROGRESS NOTE ADULT - PROBLEM SELECTOR PLAN 1
Monitor for change in voice quality  Monitor for hematoma  Monitor drain output  Advance diet as tolerated  Pain control prn  Continue management with primary team  Reconsult PRN Monitor for change in voice quality  Monitor for hematoma  Monitor drain output  Monitor PO intake and advance diet as tolerated  Pain control prn  Continue management with primary team  Reconsult PRN

## 2019-04-12 NOTE — PROVIDER CONTACT NOTE (OTHER) - ACTION/TREATMENT ORDERED:
MD allegra Vila made aware, straight cath stat ordered, will cont to benedicto post straight cath, dtv 8 hrs post straight cath. MD allegra Vila made aware, straight cath stat ordered, will cont to benedicto post straight cath.

## 2019-04-12 NOTE — PROGRESS NOTE ADULT - ASSESSMENT
58 year old Sinhala speaking man with PMH GERD, osteoarthritis and previous cervical neck surgery in 2011 who has cervical neck pain again and scheduled for C5 Corpectomy, C4-7 anterior instrumentation and fusion.  He c/o pain in neck which can reach 10/10 with activity 4/10 at rest and treats pain with Icy Hot and Etodolac. On 4/11, he came through same day ambulatory and underwent a C5 Corpectomy and C4-C7 ACDF.     Plan:   - Cervical collar in place  - Pain control: C/w dexmethasone 4q6. Oxycodone and methocarbamol PRN  - C/w bowel regimen: senna, colace. GI ppx with pantoprazole  - C/w IVF LR @ 75  - Encourage mobilization  - Encourage incentive spirometry  Will discuss with Dr. Mag Velez #[id number] 58 year old Czech speaking man with PMH GERD, osteoarthritis and previous cervical neck surgery in 2011 who has cervical neck pain again and scheduled for C5 Corpectomy, C4-7 anterior instrumentation and fusion.  He c/o pain in neck which can reach 10/10 with activity 4/10 at rest and treats pain with Icy Hot and Etodolac. On 4/11, he came through same day ambulatory and underwent a C5 Corpectomy and C4-C7 ACDF.     Plan:   - Cervical collar in place per Dr. Hathaway  - Pain control: C/w dexmethasone 4q6. Oxycodone and methocarbamol PRN  - C/w bowel regimen: senna, colace. GI ppx with pantoprazole  - C/w IVF LR @ 75  - Encourage mobilization  - Encourage incentive spirometry  - DVT ppx: Start SQL, SCDs  Will discuss with Dr. Hathaway  Spectralink #[id number]

## 2019-04-13 DIAGNOSIS — K21.9 GASTRO-ESOPHAGEAL REFLUX DISEASE WITHOUT ESOPHAGITIS: ICD-10-CM

## 2019-04-13 DIAGNOSIS — J96.01 ACUTE RESPIRATORY FAILURE WITH HYPOXIA: ICD-10-CM

## 2019-04-13 DIAGNOSIS — D72.828 OTHER ELEVATED WHITE BLOOD CELL COUNT: ICD-10-CM

## 2019-04-13 DIAGNOSIS — M81.0 AGE-RELATED OSTEOPOROSIS WITHOUT CURRENT PATHOLOGICAL FRACTURE: ICD-10-CM

## 2019-04-13 LAB
ANION GAP SERPL CALC-SCNC: 13 MMOL/L — SIGNIFICANT CHANGE UP (ref 5–17)
BASOPHILS # BLD AUTO: 0.01 K/UL — SIGNIFICANT CHANGE UP (ref 0–0.2)
BASOPHILS NFR BLD AUTO: 0.1 % — SIGNIFICANT CHANGE UP (ref 0–2)
BUN SERPL-MCNC: 13 MG/DL — SIGNIFICANT CHANGE UP (ref 7–23)
CALCIUM SERPL-MCNC: 9.7 MG/DL — SIGNIFICANT CHANGE UP (ref 8.4–10.5)
CHLORIDE SERPL-SCNC: 100 MMOL/L — SIGNIFICANT CHANGE UP (ref 96–108)
CO2 SERPL-SCNC: 24 MMOL/L — SIGNIFICANT CHANGE UP (ref 22–31)
CREAT SERPL-MCNC: 0.68 MG/DL — SIGNIFICANT CHANGE UP (ref 0.5–1.3)
EOSINOPHIL # BLD AUTO: 0.01 K/UL — SIGNIFICANT CHANGE UP (ref 0–0.5)
EOSINOPHIL NFR BLD AUTO: 0.1 % — SIGNIFICANT CHANGE UP (ref 0–6)
GLUCOSE SERPL-MCNC: 112 MG/DL — HIGH (ref 70–99)
HCT VFR BLD CALC: 41.6 % — SIGNIFICANT CHANGE UP (ref 39–50)
HGB BLD-MCNC: 14.1 G/DL — SIGNIFICANT CHANGE UP (ref 13–17)
IMM GRANULOCYTES NFR BLD AUTO: 0.3 % — SIGNIFICANT CHANGE UP (ref 0–1.5)
LYMPHOCYTES # BLD AUTO: 17.7 % — SIGNIFICANT CHANGE UP (ref 13–44)
LYMPHOCYTES # BLD AUTO: 2.52 K/UL — SIGNIFICANT CHANGE UP (ref 1–3.3)
MCHC RBC-ENTMCNC: 31.3 PG — SIGNIFICANT CHANGE UP (ref 27–34)
MCHC RBC-ENTMCNC: 33.9 GM/DL — SIGNIFICANT CHANGE UP (ref 32–36)
MCV RBC AUTO: 92.2 FL — SIGNIFICANT CHANGE UP (ref 80–100)
MONOCYTES # BLD AUTO: 1.66 K/UL — HIGH (ref 0–0.9)
MONOCYTES NFR BLD AUTO: 11.6 % — SIGNIFICANT CHANGE UP (ref 2–14)
NEUTROPHILS # BLD AUTO: 10.03 K/UL — HIGH (ref 1.8–7.4)
NEUTROPHILS NFR BLD AUTO: 70.2 % — SIGNIFICANT CHANGE UP (ref 43–77)
PLATELET # BLD AUTO: 182 K/UL — SIGNIFICANT CHANGE UP (ref 150–400)
POTASSIUM SERPL-MCNC: 4.6 MMOL/L — SIGNIFICANT CHANGE UP (ref 3.5–5.3)
POTASSIUM SERPL-SCNC: 4.6 MMOL/L — SIGNIFICANT CHANGE UP (ref 3.5–5.3)
RBC # BLD: 4.51 M/UL — SIGNIFICANT CHANGE UP (ref 4.2–5.8)
RBC # FLD: 13.2 % — SIGNIFICANT CHANGE UP (ref 10.3–14.5)
SODIUM SERPL-SCNC: 137 MMOL/L — SIGNIFICANT CHANGE UP (ref 135–145)
WBC # BLD: 14.27 K/UL — HIGH (ref 3.8–10.5)
WBC # FLD AUTO: 14.27 K/UL — HIGH (ref 3.8–10.5)

## 2019-04-13 PROCEDURE — 99223 1ST HOSP IP/OBS HIGH 75: CPT

## 2019-04-13 RX ADMIN — PANTOPRAZOLE SODIUM 40 MILLIGRAM(S): 20 TABLET, DELAYED RELEASE ORAL at 05:32

## 2019-04-13 RX ADMIN — ENOXAPARIN SODIUM 40 MILLIGRAM(S): 100 INJECTION SUBCUTANEOUS at 21:56

## 2019-04-13 RX ADMIN — Medication 1 TABLET(S): at 13:26

## 2019-04-13 RX ADMIN — Medication 100 MILLIGRAM(S): at 13:26

## 2019-04-13 RX ADMIN — Medication 3 MILLILITER(S): at 05:33

## 2019-04-13 NOTE — CONSULT NOTE ADULT - SUBJECTIVE AND OBJECTIVE BOX
Mr. Honeycutt is a 58 year old Faroese speaking man with PMH GERD, osteoarthritis and previous cervical neck surgery in 2011 who has cervical neck pain again now s/p C5 Corpectomy and C4-7 anterior instrumentation and fusion. He currently denies any pain at bedside. He denies shortness of breath.     Home Medications:  etodolac 300 mg oral capsule: 1 cap(s) orally 3 times a day (11 Apr 2019 09:49)  glucotinol dominic complex: 1 tab(s) orally 3 times a day (11 Apr 2019 09:49)  omeprazole 40 mg oral delayed release capsule: 1 cap(s) orally once a day (11 Apr 2019 09:49)  probiotics: 1 tab(s) orally 3 times a day (11 Apr 2019 09:49)  secreto natural artri-life: 1 tab(s) orally once a day (11 Apr 2019 09:49)    PAST MEDICAL & SURGICAL HISTORY:  Pre-diabetes: as per patient  Generalized headaches: tension vs migraine, last one 3/25/19  GERD without esophagitis: controlled with diet and omeprazole  Osteoarthritis  Language barrier to communication: Faroese speaking, will need   H/O neck surgery: 2011, herniated discs    Review of Systems:   CONSTITUTIONAL: No fever, weight loss, or fatigue  EYES: No eye pain, visual disturbances, or discharge  ENMT:  No difficulty hearing, tinnitus, vertigo; No sinus or throat pain    RESPIRATORY: No cough, wheezing, chills or hemoptysis; No shortness of breath  CARDIOVASCULAR: No chest pain, palpitations, dizziness, or leg swelling  GASTROINTESTINAL: No abdominal or epigastric pain. No nausea, vomiting, or hematemesis  GENITOURINARY: No dysuria, frequency, hematuria, or incontinence  NEUROLOGICAL: No headaches, memory loss, loss of strength, numbness, or tremors  SKIN: No itching, burning, rashes, or lesions   MUSCULOSKELETAL: No joint pain or swelling; No muscle, back, or extremity pain    Allergies: nkda    Social History: denies etoh, drug or tobacco use    FAMILY HISTORY: hypertension in father     MEDICATIONS  (STANDING):  ceFAZolin   IVPB 2000 milliGRAM(s) IV Intermittent once  docusate sodium 100 milliGRAM(s) Oral three times a day  enoxaparin Injectable 40 milliGRAM(s) SubCutaneous at bedtime  lactobacillus acidophilus 1 Tablet(s) Oral daily  multivitamin 1 Tablet(s) Oral daily  pantoprazole    Tablet 40 milliGRAM(s) Oral before breakfast  senna 2 Tablet(s) Oral at bedtime    MEDICATIONS  (PRN):  ALBUTerol/ipratropium for Nebulization 3 milliLiter(s) Nebulizer every 6 hours PRN Shortness of Breath and/or Wheezing  magnesium hydroxide Suspension 30 milliLiter(s) Oral every 12 hours PRN Constipation  methocarbamol 750 milliGRAM(s) Oral every 6 hours PRN Back Spasms  ondansetron   Disintegrating Tablet 4 milliGRAM(s) Oral every 4 hours PRN Nausea  oxyCODONE    IR 5 milliGRAM(s) Oral every 4 hours PRN Moderate Pain (4 - 6)    Vital Signs Last 24 Hrs  T(C): 36.8 (13 Apr 2019 07:56), Max: 37 (13 Apr 2019 04:59)  T(F): 98.2 (13 Apr 2019 07:56), Max: 98.6 (13 Apr 2019 04:59)  HR: 95 (13 Apr 2019 07:56) (90 - 108)  BP: 116/73 (13 Apr 2019 07:56) (112/60 - 128/77)  BP(mean): --  RR: 16 (13 Apr 2019 07:56) (16 - 16)  SpO2: 96% (13 Apr 2019 07:56) (94% - 96%)  CAPILLARY BLOOD GLUCOSE    PHYSICAL EXAM:  GENERAL: NAD, well-developed  HEAD:  Atraumatic, Normocephalic  EYES: EOMI, PERRLA, conjunctiva and sclera clear  NECK: Supple, No JVD  CHEST/LUNG: Clear to auscultation bilaterally; No wheeze  HEART: Regular rate and rhythm; No murmurs, rubs, or gallops  ABDOMEN: Soft, Nontender, Nondistended; Bowel sounds present  EXTREMITIES:  2+ Peripheral Pulses, No clubbing, cyanosis, or edema  PSYCH: AAOx3  NEUROLOGY: non-focal  SKIN: No rashes or lesions    LABS:                        14.1   14.27 )-----------( 182      ( 13 Apr 2019 09:38 )             41.6     04-13    137  |  100  |  13  ----------------------------<  112<H>  4.6   |  24  |  0.68    Ca    9.7      13 Apr 2019 06:12            CTA LUNG--> negative for acute pulmonary embolism, negative for infiltrates

## 2019-04-13 NOTE — PROGRESS NOTE ADULT - SUBJECTIVE AND OBJECTIVE BOX
SUBJECTIVE:  Pt was seen and examined at bedside on NC 2L of O2. Pt c/o itching around the incisional. Cervical collar in place. Pt denies any chest pain, shortness of breath, cough, abdominal pain, nausea, or vomiting.     Vital Signs Last 24 Hrs  T(C): 37 (04-12-19 @ 04:26), Max: 37.2 (04-11-19 @ 22:37)  T(F): 98.6 (04-12-19 @ 04:26), Max: 98.9 (04-11-19 @ 22:37)  HR: 102 (04-12-19 @ 04:26) (79 - 102)  BP: 115/69 (04-12-19 @ 04:26) (108/67 - 129/86)  BP(mean): 87 (04-11-19 @ 21:00) (83 - 103)  RR: 16 (04-12-19 @ 04:26) (12 - 18)  SpO2: 98% (04-12-19 @ 04:26) (94% - 100%)    PHYSICAL EXAM:    Constitutional: No Acute Distress     Neurological: Awake, alert, oriented to person, place and time, speech clear and fluent, face equal, tongue midline, briskly following commands, no drift, moves all extremities with 5/5 strength, sensation intact to light touch throughout    Incision: Anterior neck dressing c/d/i, drain in place    Drains: HMV serosanguinous (13 cc/24hour)    Pulmonary: Clear to Auscultation, No rales, No rhonchi, No wheezes     Cardiovascular: S1, S2, Regular rate and rhythm     Gastrointestinal: Soft, Non-tender, Non-distended, +bowel sounds x 4    Extremities: No calf tenderness bilaterally, no cyanosis, clubbing or edema      LABS:                                      14.1   14.27 )-----------( 182      ( 13 Apr 2019 09:38 )             41.6     04-13    137  |  100  |  13  ----------------------------<  112<H>  4.6   |  24  |  0.68    Ca    9.7      13 Apr 2019 06:12          04-11 @ 07:01  -  04-12 @ 07:00  --------------------------------------------------------  IN: 225 mL / OUT: 2135 mL / NET: -1910 mL        IMAGING:     MEDICATIONS:  Antibiotics:  ceFAZolin   IVPB 2000 milliGRAM(s) IV Intermittent once    Neuro:  methocarbamol 750 milliGRAM(s) Oral every 6 hours PRN Back Spasms  ondansetron   Disintegrating Tablet 4 milliGRAM(s) Oral every 4 hours PRN Nausea  oxyCODONE    IR 5 milliGRAM(s) Oral every 4 hours PRN Moderate Pain (4 - 6)    Cardiac:    Pulm:    GI/:  docusate sodium 100 milliGRAM(s) Oral three times a day  magnesium hydroxide Suspension 30 milliLiter(s) Oral every 12 hours PRN Constipation  pantoprazole    Tablet 40 milliGRAM(s) Oral before breakfast  senna 2 Tablet(s) Oral at bedtime    Other:   dexamethasone  Injectable 4 milliGRAM(s) IV Push every 6 hours  lactated ringers. 1000 milliLiter(s) IV Continuous <Continuous>  lactobacillus acidophilus 1 Tablet(s) Oral daily  multivitamin 1 Tablet(s) Oral daily        DIET: Full Liquid Diet SUBJECTIVE:  Pt was seen and examined at bedside on NC 2L of O2. Pt c/o itching around the incisional. Cervical collar in place. Pt denies any chest pain, shortness of breath, cough, abdominal pain, nausea, or vomiting. .    Vital Signs Last 24 Hrs  T(C): 37 (04-12-19 @ 04:26), Max: 37.2 (04-11-19 @ 22:37)  T(F): 98.6 (04-12-19 @ 04:26), Max: 98.9 (04-11-19 @ 22:37)  HR: 102 (04-12-19 @ 04:26) (79 - 102)  BP: 115/69 (04-12-19 @ 04:26) (108/67 - 129/86)  BP(mean): 87 (04-11-19 @ 21:00) (83 - 103)  RR: 16 (04-12-19 @ 04:26) (12 - 18)  SpO2: 98% (04-12-19 @ 04:26) (94% - 100%)    PHYSICAL EXAM:    Constitutional: No Acute Distress     Neurological: Awake, alert, oriented to person, place and time, speech clear and fluent, face equal, tongue midline, briskly following commands, no drift, moves all extremities with 5/5 strength, sensation intact to light touch throughout    Incision: Anterior neck dressing c/d/i, drain in place    Drains: HMV serosanguinous (13 cc/24hour)    Pulmonary: Clear to Auscultation, No rales, No rhonchi, No wheezes     Cardiovascular: S1, S2, Regular rate and rhythm     Gastrointestinal: Soft, Non-tender, Non-distended, +bowel sounds x 4    Extremities: No calf tenderness bilaterally, no cyanosis, clubbing or edema      LABS:                                      14.1   14.27 )-----------( 182      ( 13 Apr 2019 09:38 )             41.6     04-13    137  |  100  |  13  ----------------------------<  112<H>  4.6   |  24  |  0.68    Ca    9.7      13 Apr 2019 06:12          04-11 @ 07:01  -  04-12 @ 07:00  --------------------------------------------------------  IN: 225 mL / OUT: 2135 mL / NET: -1910 mL        IMAGING:     MEDICATIONS:  Antibiotics:  ceFAZolin   IVPB 2000 milliGRAM(s) IV Intermittent once    Neuro:  methocarbamol 750 milliGRAM(s) Oral every 6 hours PRN Back Spasms  ondansetron   Disintegrating Tablet 4 milliGRAM(s) Oral every 4 hours PRN Nausea  oxyCODONE    IR 5 milliGRAM(s) Oral every 4 hours PRN Moderate Pain (4 - 6)    Cardiac:    Pulm:    GI/:  docusate sodium 100 milliGRAM(s) Oral three times a day  magnesium hydroxide Suspension 30 milliLiter(s) Oral every 12 hours PRN Constipation  pantoprazole    Tablet 40 milliGRAM(s) Oral before breakfast  senna 2 Tablet(s) Oral at bedtime    Other:   dexamethasone  Injectable 4 milliGRAM(s) IV Push every 6 hours  lactated ringers. 1000 milliLiter(s) IV Continuous <Continuous>  lactobacillus acidophilus 1 Tablet(s) Oral daily  multivitamin 1 Tablet(s) Oral daily        DIET: Full Liquid Diet SUBJECTIVE:  Pt was seen and examined at bedside on NC 2L of O2. Pt c/o itching around the incision site. Tolerating full liquid diet well. Pt denies any chest pain, shortness of breath, cough, abdominal pain, nausea, or vomiting. .    Vital Signs Last 24 Hrs  T(C): 37 (04-12-19 @ 04:26), Max: 37.2 (04-11-19 @ 22:37)  T(F): 98.6 (04-12-19 @ 04:26), Max: 98.9 (04-11-19 @ 22:37)  HR: 102 (04-12-19 @ 04:26) (79 - 102)  BP: 115/69 (04-12-19 @ 04:26) (108/67 - 129/86)  BP(mean): 87 (04-11-19 @ 21:00) (83 - 103)  RR: 16 (04-12-19 @ 04:26) (12 - 18)  SpO2: 98% (04-12-19 @ 04:26) (94% - 100%)    PHYSICAL EXAM:    Constitutional: No Acute Distress     Neurological: Awake, alert, oriented to person, place and time, speech clear and fluent, face equal, tongue midline, briskly following commands, no drift, moves all extremities with 5/5 strength, sensation intact to light touch throughout    Incision: Anterior neck dressing c/d/i, drain in place    Drains: HMV serosanguinous (13 cc/24hour)    Pulmonary: Clear to Auscultation, No rales, No rhonchi, No wheezes     Cardiovascular: S1, S2, Regular rate and rhythm     Gastrointestinal: Soft, Non-tender, Non-distended, +bowel sounds x 4    Extremities: No calf tenderness bilaterally, no cyanosis, clubbing or edema      LABS:                                      14.1   14.27 )-----------( 182      ( 13 Apr 2019 09:38 )             41.6     04-13    137  |  100  |  13  ----------------------------<  112<H>  4.6   |  24  |  0.68    Ca    9.7      13 Apr 2019 06:12          04-11 @ 07:01  -  04-12 @ 07:00  --------------------------------------------------------  IN: 225 mL / OUT: 2135 mL / NET: -1910 mL        IMAGING:     < from: CT Angio Chest w/ IV Cont (04.12.19 @ 17:41) >  EXAM:  CT ANGIO CHEST (W)AW IC                            PROCEDURE DATE:  04/12/2019            INTERPRETATION:  CLINICAL INFORMATION: Evaluate for pulmonary emboli.   Status post spinal surgery.    COMPARISON: Chest radiograph 4/12/2019.    PROCEDURE:   CT Angiography of the Chest.  90 ml of Omnipaque 350 was injected intravenously. 10 ml were discarded.  Sagittal and coronal reformats were performed as well as 3D (MIP)   reconstructions.      FINDINGS:    CHEST:     LUNGS AND LARGE AIRWAYS: Bibasilar linear atelectasis. Patent central   airways.   PLEURA: No pleural effusion.  VESSELS: No pulmonary emboli is visualized. Calcified atherosclerosis.  HEART: Heart size is normal. No pericardial effusion.  MEDIASTINUM AND BORA: No lymphadenopathy.  CHEST WALL AND LOWER NECK: Trace air within the chest wall and   prevertebral soft tissues compatible with the patient's recent surgical   history. Partially imaged drainage catheter in the left anterior neck   soft tissues.  VISUALIZED UPPER ABDOMEN: Within normal limits.  BONES: Partially imaged cervical spinal fusion hardware. Spondylosis.    IMPRESSION: No pulmonary emboli or infiltrates.    Minimal postsurgical changes.    < end of copied text >        MEDICATIONS:  Antibiotics:  ceFAZolin   IVPB 2000 milliGRAM(s) IV Intermittent once    Neuro:  methocarbamol 750 milliGRAM(s) Oral every 6 hours PRN Back Spasms  ondansetron   Disintegrating Tablet 4 milliGRAM(s) Oral every 4 hours PRN Nausea  oxyCODONE    IR 5 milliGRAM(s) Oral every 4 hours PRN Moderate Pain (4 - 6)    Cardiac:    Pulm:    GI/:  docusate sodium 100 milliGRAM(s) Oral three times a day  magnesium hydroxide Suspension 30 milliLiter(s) Oral every 12 hours PRN Constipation  pantoprazole    Tablet 40 milliGRAM(s) Oral before breakfast  senna 2 Tablet(s) Oral at bedtime    Other:   dexamethasone  Injectable 4 milliGRAM(s) IV Push every 6 hours  lactated ringers. 1000 milliLiter(s) IV Continuous <Continuous>  lactobacillus acidophilus 1 Tablet(s) Oral daily  multivitamin 1 Tablet(s) Oral daily        DIET: Full Liquid Diet SUBJECTIVE:  Pt was seen and examined at bedside on NC 2L of O2. Pt c/o itching around the incision site. Tolerating full liquid diet well. Pt denies any chest pain, shortness of breath, abdominal pain, nausea, or vomiting. .    Vital Signs Last 24 Hrs  T(C): 37 (04-12-19 @ 04:26), Max: 37.2 (04-11-19 @ 22:37)  T(F): 98.6 (04-12-19 @ 04:26), Max: 98.9 (04-11-19 @ 22:37)  HR: 102 (04-12-19 @ 04:26) (79 - 102)  BP: 115/69 (04-12-19 @ 04:26) (108/67 - 129/86)  BP(mean): 87 (04-11-19 @ 21:00) (83 - 103)  RR: 16 (04-12-19 @ 04:26) (12 - 18)  SpO2: 98% (04-12-19 @ 04:26) (94% - 100%)    PHYSICAL EXAM:    Constitutional: No Acute Distress     Neurological: Awake, alert, oriented to person, place and time, speech clear and fluent, face equal, tongue midline, briskly following commands, no drift, moves all extremities with 5/5 strength, sensation intact to light touch throughout     Incision: Anterior neck dressing c/d/i, drain in place    Drains: HMV serosanguinous (13 cc/24hour)    Pulmonary: Clear to Auscultation, No rales, No rhonchi, No wheezes     Cardiovascular: S1, S2, Regular rate and rhythm     Gastrointestinal: Soft, Non-tender, Non-distended, +bowel sounds x 4    Extremities: No calf tenderness bilaterally, no cyanosis, clubbing or edema      LABS:                                      14.1   14.27 )-----------( 182      ( 13 Apr 2019 09:38 )             41.6     04-13    137  |  100  |  13  ----------------------------<  112<H>  4.6   |  24  |  0.68    Ca    9.7      13 Apr 2019 06:12          04-11 @ 07:01  -  04-12 @ 07:00  --------------------------------------------------------  IN: 225 mL / OUT: 2135 mL / NET: -1910 mL        IMAGING:     < from: CT Angio Chest w/ IV Cont (04.12.19 @ 17:41) >  EXAM:  CT ANGIO CHEST (W)AW IC                            PROCEDURE DATE:  04/12/2019            INTERPRETATION:  CLINICAL INFORMATION: Evaluate for pulmonary emboli.   Status post spinal surgery.    COMPARISON: Chest radiograph 4/12/2019.    PROCEDURE:   CT Angiography of the Chest.  90 ml of Omnipaque 350 was injected intravenously. 10 ml were discarded.  Sagittal and coronal reformats were performed as well as 3D (MIP)   reconstructions.      FINDINGS:    CHEST:     LUNGS AND LARGE AIRWAYS: Bibasilar linear atelectasis. Patent central   airways.   PLEURA: No pleural effusion.  VESSELS: No pulmonary emboli is visualized. Calcified atherosclerosis.  HEART: Heart size is normal. No pericardial effusion.  MEDIASTINUM AND BORA: No lymphadenopathy.  CHEST WALL AND LOWER NECK: Trace air within the chest wall and   prevertebral soft tissues compatible with the patient's recent surgical   history. Partially imaged drainage catheter in the left anterior neck   soft tissues.  VISUALIZED UPPER ABDOMEN: Within normal limits.  BONES: Partially imaged cervical spinal fusion hardware. Spondylosis.    IMPRESSION: No pulmonary emboli or infiltrates.    Minimal postsurgical changes.    < end of copied text >        MEDICATIONS:  Antibiotics:  ceFAZolin   IVPB 2000 milliGRAM(s) IV Intermittent once    Neuro:  methocarbamol 750 milliGRAM(s) Oral every 6 hours PRN Back Spasms  ondansetron   Disintegrating Tablet 4 milliGRAM(s) Oral every 4 hours PRN Nausea  oxyCODONE    IR 5 milliGRAM(s) Oral every 4 hours PRN Moderate Pain (4 - 6)    Cardiac:    Pulm:    GI/:  docusate sodium 100 milliGRAM(s) Oral three times a day  magnesium hydroxide Suspension 30 milliLiter(s) Oral every 12 hours PRN Constipation  pantoprazole    Tablet 40 milliGRAM(s) Oral before breakfast  senna 2 Tablet(s) Oral at bedtime    Other:   dexamethasone  Injectable 4 milliGRAM(s) IV Push every 6 hours  lactated ringers. 1000 milliLiter(s) IV Continuous <Continuous>  lactobacillus acidophilus 1 Tablet(s) Oral daily  multivitamin 1 Tablet(s) Oral daily        DIET: Full Liquid Diet SUBJECTIVE:  Pt was seen and examined at bedside on NC 2L of O2. Pt c/o itching around the incision site. Tolerating full liquid diet well. Pt denies any chest pain, shortness of breath, abdominal pain, nausea, or vomiting. .    Vital Signs Last 24 Hrs  T(C): 37 (04-12-19 @ 04:26), Max: 37.2 (04-11-19 @ 22:37)  T(F): 98.6 (04-12-19 @ 04:26), Max: 98.9 (04-11-19 @ 22:37)  HR: 102 (04-12-19 @ 04:26) (79 - 102)  BP: 115/69 (04-12-19 @ 04:26) (108/67 - 129/86)  BP(mean): 87 (04-11-19 @ 21:00) (83 - 103)  RR: 16 (04-12-19 @ 04:26) (12 - 18)  SpO2: 98% (04-12-19 @ 04:26) (94% - 100%)    PHYSICAL EXAM:    Constitutional: No Acute Distress     Neurological: Awake, alert, oriented to person, place and time in [place] ([employer] #[id number]), speech clear and fluent, face equal, tongue midline, briskly following commands, no drift, moves all extremities with 5/5 strength, sensation intact to light touch throughout     Incision: Anterior neck dressing c/d/i, drain in place    Drains: HMV serosanguinous (13 cc/24hour)    Pulmonary: Clear to Auscultation, No rales, No rhonchi, No wheezes     Cardiovascular: S1, S2, Regular rate and rhythm     Gastrointestinal: Soft, Non-tender, Non-distended, +bowel sounds x 4    Extremities: No calf tenderness bilaterally, no cyanosis, clubbing or edema      LABS:                                      14.1   14.27 )-----------( 182      ( 13 Apr 2019 09:38 )             41.6     04-13    137  |  100  |  13  ----------------------------<  112<H>  4.6   |  24  |  0.68    Ca    9.7      13 Apr 2019 06:12          04-11 @ 07:01  -  04-12 @ 07:00  --------------------------------------------------------  IN: 225 mL / OUT: 2135 mL / NET: -1910 mL        IMAGING:     < from: CT Angio Chest w/ IV Cont (04.12.19 @ 17:41) >  EXAM:  CT ANGIO CHEST (W)AW IC                            PROCEDURE DATE:  04/12/2019            INTERPRETATION:  CLINICAL INFORMATION: Evaluate for pulmonary emboli.   Status post spinal surgery.    COMPARISON: Chest radiograph 4/12/2019.    PROCEDURE:   CT Angiography of the Chest.  90 ml of Omnipaque 350 was injected intravenously. 10 ml were discarded.  Sagittal and coronal reformats were performed as well as 3D (MIP)   reconstructions.      FINDINGS:    CHEST:     LUNGS AND LARGE AIRWAYS: Bibasilar linear atelectasis. Patent central   airways.   PLEURA: No pleural effusion.  VESSELS: No pulmonary emboli is visualized. Calcified atherosclerosis.  HEART: Heart size is normal. No pericardial effusion.  MEDIASTINUM AND BORA: No lymphadenopathy.  CHEST WALL AND LOWER NECK: Trace air within the chest wall and   prevertebral soft tissues compatible with the patient's recent surgical   history. Partially imaged drainage catheter in the left anterior neck   soft tissues.  VISUALIZED UPPER ABDOMEN: Within normal limits.  BONES: Partially imaged cervical spinal fusion hardware. Spondylosis.    IMPRESSION: No pulmonary emboli or infiltrates.    Minimal postsurgical changes.    < end of copied text >        MEDICATIONS:  Antibiotics:  ceFAZolin   IVPB 2000 milliGRAM(s) IV Intermittent once    Neuro:  methocarbamol 750 milliGRAM(s) Oral every 6 hours PRN Back Spasms  ondansetron   Disintegrating Tablet 4 milliGRAM(s) Oral every 4 hours PRN Nausea  oxyCODONE    IR 5 milliGRAM(s) Oral every 4 hours PRN Moderate Pain (4 - 6)    Cardiac:    Pulm:    GI/:  docusate sodium 100 milliGRAM(s) Oral three times a day  magnesium hydroxide Suspension 30 milliLiter(s) Oral every 12 hours PRN Constipation  pantoprazole    Tablet 40 milliGRAM(s) Oral before breakfast  senna 2 Tablet(s) Oral at bedtime    Other:   dexamethasone  Injectable 4 milliGRAM(s) IV Push every 6 hours  lactated ringers. 1000 milliLiter(s) IV Continuous <Continuous>  lactobacillus acidophilus 1 Tablet(s) Oral daily  multivitamin 1 Tablet(s) Oral daily        DIET: Full Liquid Diet

## 2019-04-13 NOTE — PROGRESS NOTE ADULT - ASSESSMENT
HPI: 58 year old Urdu speaking man with PMH GERD, osteoarthritis and previous cervical neck surgery in 2011 who has cervical neck pain again and scheduled for C5 Corpectomy, C4-7 anterior instrumentation and fusion.  He c/o pain in neck which can reach 10/10 with activity 4/10 at rest and treats pain with Icy Hot and Etodolac. On 4/11, he came through same day ambulatory and underwent a C5 Corpectomy and C4-C7 ACDF. Yesterday, was found to be hypoxic and underwent CT chest with IV cont to r/o PE. Pt placed on 2L of O2 via NC.    Plan:   - Hypoxic yesterday, CXR negative and preliminary CT Chest negative for PE, possible atelectasis, c/w 2L of O2 via NC  - HMV removal today  - Urinary retention during hospital course, TOV today  - Pain control: C/w dexmethasone 4q6. Oxycodone and methocarbamol PRN  - C/w bowel regimen: senna, colace. GI ppx with pantoprazole  - IVL  - Encourage mobilization  - Encourage incentive spirometry  - DVT ppx: Start SQL, SCDs  Will discuss with Dr. Mag Velez #[id number] HPI: 58 year old Czech speaking man with PMH GERD, osteoarthritis and previous cervical neck surgery in 2011 who has cervical neck pain again and scheduled for C5 Corpectomy, C4-7 anterior instrumentation and fusion.  He c/o pain in neck which can reach 10/10 with activity 4/10 at rest and treats pain with Icy Hot and Etodolac. On 4/11, he came through same day ambulatory and underwent a C5 Corpectomy and C4-C7 ACDF. Yesterday, was found to be hypoxic and underwent CT chest with IV cont to r/o PE. Pt placed on 2L of O2 via NC.    Plan:   - Hypoxic yesterday, CXR negative and preliminary CT Chest negative for PE, possible atelectasis, c/w 2L of O2 via NC  - HMV removal today  - Urinary retention during hospital course, TOV today  - Pain control: C/w dexmethasone 4q6. Oxycodone and methocarbamol PRN  - C/w bowel regimen: senna, colace. GI ppx with pantoprazole  - IVL  - Encourage mobilization  - Encouraged incentive spirometry and instructed patient on proper use  - DVT ppx: Start SQL, SCDs  Will discuss with Dr. Mag Velez #[id number]

## 2019-04-13 NOTE — CHART NOTE - NSCHARTNOTEFT_GEN_A_CORE
MARY WILD [id number]      Drain type: []SD []SG [] BRYANT [x] HMV [] Lumbar drain [] EVD [] ICP [place] [] Abd drain [] Central Line _________    Patient's position while drain removed: Supine    [x] Patient tolerated well [x] No complications [] complications:     Exit Site secured with: [] __ staples [] __ suture (please specify how many of each): [x] dressing    Additional Info: Cervical HMV taken off suction, sutures removed and dressing was placed.

## 2019-04-13 NOTE — CONSULT NOTE ADULT - ATTENDING COMMENTS
dvt ppx: c/w lovenox daily, per primary team    communication: discussed with patients wife at bedside    discussed with neurosurgical CARLIE Maharaj at [phone]

## 2019-04-13 NOTE — CONSULT NOTE ADULT - ASSESSMENT
Patient is a 58 year old Cambodian speaking man with GERD, osteoarthritis and previous cervical neck surgery in 2011 who has cervical neck pain again is now s/p C5 Corpectomy, C4-7 anterior instrumentation and fusion. Course complicated by acute hypoxic respiratory failure which has now resolved.

## 2019-04-14 ENCOUNTER — TRANSCRIPTION ENCOUNTER (OUTPATIENT)
Age: 59
End: 2019-04-14

## 2019-04-14 VITALS
RESPIRATION RATE: 18 BRPM | HEART RATE: 85 BPM | OXYGEN SATURATION: 94 % | TEMPERATURE: 98 F | DIASTOLIC BLOOD PRESSURE: 87 MMHG | SYSTOLIC BLOOD PRESSURE: 131 MMHG

## 2019-04-14 LAB
ANION GAP SERPL CALC-SCNC: 13 MMOL/L — SIGNIFICANT CHANGE UP (ref 5–17)
BASOPHILS # BLD AUTO: 0.02 K/UL — SIGNIFICANT CHANGE UP (ref 0–0.2)
BASOPHILS NFR BLD AUTO: 0.2 % — SIGNIFICANT CHANGE UP (ref 0–2)
BUN SERPL-MCNC: 15 MG/DL — SIGNIFICANT CHANGE UP (ref 7–23)
CALCIUM SERPL-MCNC: 9.4 MG/DL — SIGNIFICANT CHANGE UP (ref 8.4–10.5)
CHLORIDE SERPL-SCNC: 101 MMOL/L — SIGNIFICANT CHANGE UP (ref 96–108)
CO2 SERPL-SCNC: 26 MMOL/L — SIGNIFICANT CHANGE UP (ref 22–31)
CREAT SERPL-MCNC: 0.71 MG/DL — SIGNIFICANT CHANGE UP (ref 0.5–1.3)
EOSINOPHIL # BLD AUTO: 0.17 K/UL — SIGNIFICANT CHANGE UP (ref 0–0.5)
EOSINOPHIL NFR BLD AUTO: 1.9 % — SIGNIFICANT CHANGE UP (ref 0–6)
GLUCOSE SERPL-MCNC: 100 MG/DL — HIGH (ref 70–99)
HCT VFR BLD CALC: 42.7 % — SIGNIFICANT CHANGE UP (ref 39–50)
HGB BLD-MCNC: 14 G/DL — SIGNIFICANT CHANGE UP (ref 13–17)
IMM GRANULOCYTES NFR BLD AUTO: 0.1 % — SIGNIFICANT CHANGE UP (ref 0–1.5)
LYMPHOCYTES # BLD AUTO: 2.79 K/UL — SIGNIFICANT CHANGE UP (ref 1–3.3)
LYMPHOCYTES # BLD AUTO: 30.8 % — SIGNIFICANT CHANGE UP (ref 13–44)
MCHC RBC-ENTMCNC: 31.7 PG — SIGNIFICANT CHANGE UP (ref 27–34)
MCHC RBC-ENTMCNC: 32.8 GM/DL — SIGNIFICANT CHANGE UP (ref 32–36)
MCV RBC AUTO: 96.6 FL — SIGNIFICANT CHANGE UP (ref 80–100)
MONOCYTES # BLD AUTO: 1.24 K/UL — HIGH (ref 0–0.9)
MONOCYTES NFR BLD AUTO: 13.7 % — SIGNIFICANT CHANGE UP (ref 2–14)
NEUTROPHILS # BLD AUTO: 4.82 K/UL — SIGNIFICANT CHANGE UP (ref 1.8–7.4)
NEUTROPHILS NFR BLD AUTO: 53.3 % — SIGNIFICANT CHANGE UP (ref 43–77)
PLATELET # BLD AUTO: 177 K/UL — SIGNIFICANT CHANGE UP (ref 150–400)
POTASSIUM SERPL-MCNC: 3.9 MMOL/L — SIGNIFICANT CHANGE UP (ref 3.5–5.3)
POTASSIUM SERPL-SCNC: 3.9 MMOL/L — SIGNIFICANT CHANGE UP (ref 3.5–5.3)
RBC # BLD: 4.42 M/UL — SIGNIFICANT CHANGE UP (ref 4.2–5.8)
RBC # FLD: 13.3 % — SIGNIFICANT CHANGE UP (ref 10.3–14.5)
SODIUM SERPL-SCNC: 140 MMOL/L — SIGNIFICANT CHANGE UP (ref 135–145)
WBC # BLD: 9.05 K/UL — SIGNIFICANT CHANGE UP (ref 3.8–10.5)
WBC # FLD AUTO: 9.05 K/UL — SIGNIFICANT CHANGE UP (ref 3.8–10.5)

## 2019-04-14 PROCEDURE — 99233 SBSQ HOSP IP/OBS HIGH 50: CPT

## 2019-04-14 RX ORDER — OXYCODONE HYDROCHLORIDE 5 MG/1
1 TABLET ORAL
Qty: 10 | Refills: 0 | OUTPATIENT
Start: 2019-04-14

## 2019-04-14 RX ORDER — ETODOLAC 400 MG/1
1 TABLET, FILM COATED ORAL
Qty: 0 | Refills: 0 | COMMUNITY

## 2019-04-14 RX ORDER — DOCUSATE SODIUM 100 MG
1 CAPSULE ORAL
Qty: 0 | Refills: 0 | COMMUNITY
Start: 2019-04-14

## 2019-04-14 RX ORDER — ACETAMINOPHEN 500 MG
2 TABLET ORAL
Qty: 0 | Refills: 0 | COMMUNITY

## 2019-04-14 RX ORDER — SENNA PLUS 8.6 MG/1
2 TABLET ORAL
Qty: 0 | Refills: 0 | COMMUNITY
Start: 2019-04-14

## 2019-04-14 RX ADMIN — Medication 1 TABLET(S): at 12:07

## 2019-04-14 NOTE — DISCHARGE NOTE PROVIDER - CARE PROVIDER_API CALL
Mayte Hathaway (DO)  Neurological Surgery  [address] [place] [address], [address]  [place] [address]  Phone: [phone]  Fax: [phone]  Follow Up Time:

## 2019-04-14 NOTE — DISCHARGE NOTE PROVIDER - REASON FOR ADMISSION
4/11 C5 Corpectomy; C4-C7 ACDF 4/11/19  C5 Corpectomy; removal of previous hardware and C4-C7 ACDF (anterior cervical discectomy and fusion) for cervical stenosis with ENT.

## 2019-04-14 NOTE — DISCHARGE NOTE PROVIDER - NSDCFUADDINST_GEN_ALL_CORE_FT
please notify physician if fevers, bleeding, swelling, pain not relieved by medication, increased sluggishness or irritability, increased nausea or vomiting, inability to tolerate foods or liquids.   please do not engage in strenuous activity, heavy lifting, drive, or return to work or school until cleared by surgeon.   please keep incision clean and dry, do not submerge wound in water for prolonged periods of time, pat dry after showering, and do not use any creams or ointments to incision. Please let steri strips fall off.   Do not drive or return to school/work until cleared by surgeon

## 2019-04-14 NOTE — DISCHARGE NOTE PROVIDER - NSDCCPCAREPLAN_GEN_ALL_CORE_FT
PRINCIPAL DISCHARGE DIAGNOSIS  Diagnosis: Stenosis, cervical spine  Assessment and Plan of Treatment: 4/11/19  C5 Corpectomy; removal of previous hardware and C4-C7 ACDF (anterior cervical discectomy and fusion) for cervical stenosis with ENT.   Dr Hathaway- neurosurgeon- please call office for appointment next week. Please let steri strips fall off.   Primary care physician within 1-2 weeks of discharge.      SECONDARY DISCHARGE DIAGNOSES  Diagnosis: Age-related osteoporosis without current pathological fracture  Assessment and Plan of Treatment: Age-related osteoporosis without current pathological fracture  Please follow upw tih primary care physician within 1-2 weeks of discharge.    Diagnosis: Gastroesophageal reflux disease without esophagitis  Assessment and Plan of Treatment: Gastroesophageal reflux disease without esophagitis.    Please continue medications and follow up with your primary care physician within 1-2 weeks.

## 2019-04-14 NOTE — PROGRESS NOTE ADULT - ASSESSMENT
Patient is a 58 year old Albanian speaking man with GERD, osteoarthritis and previous cervical neck surgery in 2011 who has cervical neck pain again is now s/p C5 Corpectomy, C4-7 anterior instrumentation and fusion. Course complicated by acute hypoxic respiratory failure which has now resolved.

## 2019-04-14 NOTE — PROGRESS NOTE ADULT - SUBJECTIVE AND OBJECTIVE BOX
Patient has no complaints today. Breathing comfortably.     GENERAL: No fevers, no chills.  EYES: No blurry vision,  No photophobia  ENT: No sore throat.  No dysphagia  Cardiovascular: No chest pain, palpitations, orthopnea  Pulmonary: No cough, no wheezing. No shortness of breath  Gastrointestinal: No abdominal pain, no diarrhea, no constipation.    : No dysuria.  No hematuria  Musculoskeletal: No weakness.  No myalgias.  Dermatology:  No rashes.  Neuro: No Headache.  No vertigo.  No dizziness.  Psych: No anxiety, no depression.  Denies suicidal thoughts.    Vital Signs Last 24 Hrs  T(C): 36.7 (14 Apr 2019 07:40), Max: 37.1 (13 Apr 2019 14:38)  T(F): 98.1 (14 Apr 2019 07:40), Max: 98.7 (13 Apr 2019 14:38)  HR: 85 (14 Apr 2019 07:40) (77 - 105)  BP: 131/87 (14 Apr 2019 07:40) (120/76 - 145/88)  BP(mean): --  RR: 18 (14 Apr 2019 07:40) (18 - 18)  SpO2: 94% (14 Apr 2019 07:40) (94% - 96%)    GENERAL: NAD, well-developed  HEAD:  Atraumatic, Normocephalic  EYES: EOMI, PERRLA, conjunctiva and sclera clear  ENT: Pharynx not erythematous  PULMONARY: Clear to auscultation bilaterally; No wheeze  CARDIOVASCULAR: Regular rate and rhythm; No murmurs, rubs, or gallops  ABDOMEN: Soft, Nontender, Nondistended; Bowel sounds present  EXTREMITIES:  2+ Peripheral Pulses, No clubbing, cyanosis, or edema  MUSCULOSKELETAL: No calf tenderness  PSYCH: AAOx3, normal affect  SKIN: warm and dry, No rashes or lesions

## 2019-04-14 NOTE — PROGRESS NOTE ADULT - ASSESSMENT
HPI: 58 year old Turkish speaking man with PMH GERD, osteoarthritis and previous cervical neck surgery in 2011 who has cervical neck pain again and scheduled for C5 Corpectomy, C4-7 anterior instrumentation and fusion.  He c/o pain in neck which can reach 10/10 with activity 4/10 at rest and treats pain with Icy Hot and Etodolac. On 4/11, he came through same day ambulatory and underwent a C5 Corpectomy and C4-C7 ACDF. Yesterday, was found to be hypoxic and underwent CT chest with IV cont to r/o PE. Pt placed on 2L of O2 via NC.    Plan:   - Hypoxia resolved, O2 sats good on room air. CT Chest negative for PE  -pain control with oxycodone ans tylenol at home  - C/w bowel regimen: senna, colace. GI ppx with pantoprazole  - Encouraged incentive spirometry and instructed patient on proper use  - DVT ppx: SQL, SCDs  -PT outpatient PT   resolved leukocytosis  will d/c IVL and d/ home today  Discussed with patient and family wound care, follow up plans, activity, and medications. Questions answered, and they verbalized understanding.   RXs sent to [facility]    Will discuss with Dr. Mag Velez #[id number]    Assessment:  Please Check When Present   []  GCS  E   V  M     Heart Failure: []Acute, [] acute on chronic , []chronic  Heart Failure:  [] Diastolic (HFpEF), [] Systolic (HFrEF), []Combined (HFpEF and HFrEF), [] RHF, [] Pulm HTN, [] Other    [] EVELIA, [] ATN, [] AIN, [] other  [] CKD1, [] CKD2, [] CKD 3, [] CKD 4, [] CKD 5, []ESRD    Encephalopathy: [] Metabolic, [] Hepatic, [] toxic, [] Neurological, [] Other    Abnormal Nutritional Status: [] malnutrition (see nutrition note), [ ]underweight: BMI < 19, [] morbid obesity: BMI >40, [] Cachexia    [] Sepsis  [] hypovolemic shock,[] cardiogenic shock, [] hemorrhagic shock, [] neurogenic shock  [] Acute Respiratory Failure  []Cerebral edema, [] Brain compression/ herniation,   [] Functional quadriplegia  [] Acute blood loss anemia

## 2019-04-14 NOTE — PROGRESS NOTE ADULT - SUBJECTIVE AND OBJECTIVE BOX
SUBJECTIVE:  Pt was seen and examined at bedside doing well and anxious to go home. He refused  phone as daughter at bedside and he wanted her to translate.   Pt denies any chest pain, shortness of breath, abdominal pain, nausea, or vomiting. Tolerating PO diet well.     Vital Signs Last 24 Hrs  T(C): 36.7 (14 Apr 2019 07:40), Max: 37.1 (13 Apr 2019 14:38)  T(F): 98.1 (14 Apr 2019 07:40), Max: 98.7 (13 Apr 2019 14:38)  HR: 85 (14 Apr 2019 07:40) (77 - 105)  BP: 131/87 (14 Apr 2019 07:40) (120/76 - 145/88)  BP(mean): --  RR: 18 (14 Apr 2019 07:40) (18 - 18)  SpO2: 94% (14 Apr 2019 07:40) (94% - 96%)    PHYSICAL EXAM:    Constitutional: No Acute Distress     Neurological: Awake, alert, oriented to person, place and time in Luxem[place]ish, speech clear and fluent, face equal, tongue midline, briskly following commands, no drift, moves all extremities with 5/5 strength, sensation intact to light touch throughout; voice normal    Incision: Anterior neck steri strips C/D/I, no evidence of hematoma     Pulmonary: Clear to Auscultation, No rales, No rhonchi, No wheezes     Cardiovascular: S1, S2, Regular rate and rhythm     Gastrointestinal: Soft, Non-tender, Non-distended, +bowel sounds x 4    Extremities: No calf tenderness bilaterally, no cyanosis, clubbing or edema      LABS:                                   14.0   9.05  )-----------( 177      ( 14 Apr 2019 10:13 )             42.7   04-14    140  |  101  |  15  ----------------------------<  100<H>  3.9   |  26  |  0.71    Ca    9.4      14 Apr 2019 06:41        IMAGING:     < from: CT Angio Chest w/ IV Cont (04.12.19 @ 17:41) >  EXAM:  CT ANGIO CHEST (W)AW IC                            PROCEDURE DATE:  04/12/2019            INTERPRETATION:  CLINICAL INFORMATION: Evaluate for pulmonary emboli.   Status post spinal surgery.    COMPARISON: Chest radiograph 4/12/2019.    PROCEDURE:   CT Angiography of the Chest.  90 ml of Omnipaque 350 was injected intravenously. 10 ml were discarded.  Sagittal and coronal reformats were performed as well as 3D (MIP)   reconstructions.      FINDINGS:    CHEST:     LUNGS AND LARGE AIRWAYS: Bibasilar linear atelectasis. Patent central   airways.   PLEURA: No pleural effusion.  VESSELS: No pulmonary emboli is visualized. Calcified atherosclerosis.  HEART: Heart size is normal. No pericardial effusion.  MEDIASTINUM AND BORA: No lymphadenopathy.  CHEST WALL AND LOWER NECK: Trace air within the chest wall and   prevertebral soft tissues compatible with the patient's recent surgical   history. Partially imaged drainage catheter in the left anterior neck   soft tissues.  VISUALIZED UPPER ABDOMEN: Within normal limits.  BONES: Partially imaged cervical spinal fusion hardware. Spondylosis.    IMPRESSION: No pulmonary emboli or infiltrates.    Minimal postsurgical changes.    < end of copied text >      MEDICATIONS  (STANDING):  docusate sodium 100 milliGRAM(s) Oral three times a day  enoxaparin Injectable 40 milliGRAM(s) SubCutaneous at bedtime  lactobacillus acidophilus 1 Tablet(s) Oral daily  multivitamin 1 Tablet(s) Oral daily  pantoprazole    Tablet 40 milliGRAM(s) Oral before breakfast  senna 2 Tablet(s) Oral at bedtime    MEDICATIONS  (PRN):  ALBUTerol/ipratropium for Nebulization 3 milliLiter(s) Nebulizer every 6 hours PRN Shortness of Breath and/or Wheezing  magnesium hydroxide Suspension 30 milliLiter(s) Oral every 12 hours PRN Constipation  oxyCODONE    IR 5 milliGRAM(s) Oral every 4 hours PRN Moderate Pain (4 - 6)

## 2019-04-14 NOTE — DISCHARGE NOTE PROVIDER - HOSPITAL COURSE
4/11/19  C5 Corpectomy; removal of previous hardware and C4-C7 ACDF (anterior cervical discectomy and fusion) for cervical stenosis with ENT. Mr. Honeycutt is a 58 year old Irish speaking man with PMH GERD, osteoarthritis and previous cervical neck surgery in 2011 who has cervical neck pain again and scheduled for C5 Corpectomy, C4-7 anterior instrumentation and fusion.  He c/o pain in neck which can reach 10/10 with activity 4/10 at rest and treats pain with Icy Hot and Etodolac. (28 Mar 2019 10:18)        Patient admitted and underwent on 4/11/19 C5 Corpectomy; removal of previous hardware and C4-C7 ACDF (anterior cervical discectomy and fusion) for cervical stenosis with ENT. Post op course complicated    by POD#1 hypoxia and tachycardia; for which CTA chest completed and was negative for pulmonary embolism and has since resolved prior to discharge. Surgical drain removed on 4/13/19. Hospitalist     medicine saw in consultation. PT evaluated him and recommended outpatient PT. On 4/14/19 he was medically and neurologically stable for discharge.

## 2019-04-14 NOTE — DISCHARGE NOTE NURSING/CASE MANAGEMENT/SOCIAL WORK - NSDCDPATPORTLINK_GEN_ALL_CORE
You can access the [facility] Portal, offered by [facility], by registering with the following website: [email or web address]

## 2019-04-14 NOTE — PROGRESS NOTE ADULT - REASON FOR ADMISSION
Adm 4/11 C5 Corpectomy; C4-C7 ACDF
4/11/19  C5 Corpectomy; removal of previous hardware and C4-C7 ACDF (anterior cervical discectomy and fusion) for cervical stenosis with ENT.
Adm 4/11 C5 Corpectomy; C4-C7 ACDF
ACDF
neck pain

## 2019-04-14 NOTE — DISCHARGE NOTE PROVIDER - NSDCACTIVITY_GEN_ALL_CORE
Showering allowed/Do not drive or operate machinery/Walking - Outdoors allowed/No heavy lifting/straining/Do not make important decisions/Stairs allowed/Walking - Indoors allowed/Bathing allowed

## 2019-04-19 ENCOUNTER — APPOINTMENT (OUTPATIENT)
Dept: SPINE | Facility: CLINIC | Age: 59
End: 2019-04-19

## 2019-04-19 VITALS
SYSTOLIC BLOOD PRESSURE: 133 MMHG | WEIGHT: 150 LBS | HEIGHT: 64 IN | DIASTOLIC BLOOD PRESSURE: 84 MMHG | BODY MASS INDEX: 25.61 KG/M2

## 2019-04-19 DIAGNOSIS — M54.12 RADICULOPATHY, CERVICAL REGION: ICD-10-CM

## 2019-04-19 NOTE — HISTORY OF PRESENT ILLNESS
[FreeTextEntry1] : Mr Honeycutt is a  58 year old male who in the past underwent an ACDF in 2011 and came to the office with recurrent  right arm pain and right shoulder pain into his last two digits.  he began to have coordination issues since January 2019 and noted weakness as well.   Severe degenerative disease was noted on imaging of the cervical spine from C7 to T1.  \par \par \par On 4/11/2019 he was taken to the  OR for a revision of his prior cervical fusion  and hardware was removed and a C 4 thru C 6 fusion was performed.  He suffered no complications and was discharged with an uneventful hospitalization.   Today he arrives post op day 8 and has improvement of his pain on the right arm.  He does state that the weakness is better.  he has no swallowing difficulty and no bowel or balder issues.  He is walking freely and independently.  On his left arm he developed  a ecchymotic area near his anticubital vein from an IV site.   He has discomfort at this site.  His thumb is painful as well.  he takes oxycodone 5 mg only at night.  He self removed his incisional dressing of all steri strips.    \par \par

## 2019-04-19 NOTE — SOCIAL HISTORY
[Yes - full time] : Yes - full time [FreeTextEntry1] : never a smoker, , works but not currently post op

## 2019-04-19 NOTE — PHYSICAL EXAM
[General Appearance - In No Acute Distress] : in no acute distress [General Appearance - Alert] : alert [Longitudinal] : longitudinal [General Appearance - Well Nourished] : well nourished [Dry] : dry [Clean] : clean [Intact] : intact [Healing Well] : healing well [Erythema] : not erythematous [No Drainage] : without drainage [Normal Skin] : normal [Tender] : not tender [Warm] : not warm [Normal Skin Turgor] : skin turgor was normal [Impaired Insight] : insight and judgment were intact [Oriented To Time, Place, And Person] : oriented to person, place, and time [Affect] : the affect was normal [Mood] : the mood was normal [Person] : oriented to person [Place] : oriented to place [Time] : oriented to time [Short Term Intact] : short term memory intact [Remote Intact] : remote memory intact [Registration Intact] : recent registration memory intact [Motor Strength] : muscle strength was normal in all four extremities [Involuntary Movements] : no involuntary movements were seen [Coordination - Dysmetria Impaired Finger-to-Nose Bilateral] : present bilaterally [Limited Balance] : balance was intact [Balance] : balance was intact [FreeTextEntry6] : UE strength 5/5 [No Visual Abnormalities] : no visible abnormalities [Sclera] : the sclera and conjunctiva were normal [Outer Ear] : the ears and nose were normal in appearance [Abnormal Walk] : normal gait [] : no respiratory distress [Neck Appearance] : the appearance of the neck was normal [FreeTextEntry1] : left forearm with ecchymosis area and neurovascular intact, good radial pulse and positive brisk capillary refill , FROM of left UE   [Skin Color & Pigmentation] : normal skin color and pigmentation

## 2019-04-19 NOTE — ASSESSMENT
[FreeTextEntry1] : Assess:\par Cervical Stenosis\par Prior ACDF \par 4/11/2019 -  8 day post op follow up from cervical fusion revision C 4 - C 7 \par Pain of R UE improved ]\par Left UE ecchymosis from IV during hospital stay\par No neurovascular compromise of R UE \par \par PLAN:\par Renewed Oxycodone for one week\par Instructed to utilize Tylenol PM for incisional pain \par Return in one month for follow up \par One month he will attain a cervical x-ray for review \par No lifting - up to ten pounds \par He will return to work in 6 - 8 weeks and will be evaluated  in the end of May \par Keep incision dry and clean and any changes he will contact the office \par If the left UE swells, appears errythemic, or edematous he will call his PCP\par \par  \par

## 2019-04-19 NOTE — REASON FOR VISIT
[de-identified] : Removal of prior cervical hardware and ACDF of C4  - C6 [de-identified] : 04/11/2019 [de-identified] :  : [id number]   [Family Member] : family member

## 2019-04-23 PROBLEM — Z98.1 S/P CERVICAL SPINAL FUSION: Status: ACTIVE | Noted: 2019-04-23

## 2019-05-28 ENCOUNTER — APPOINTMENT (OUTPATIENT)
Dept: SPINE | Facility: CLINIC | Age: 59
End: 2019-05-28
Payer: MEDICAID

## 2019-05-28 VITALS
SYSTOLIC BLOOD PRESSURE: 116 MMHG | BODY MASS INDEX: 25.8 KG/M2 | HEIGHT: 62.25 IN | DIASTOLIC BLOOD PRESSURE: 74 MMHG | HEART RATE: 87 BPM | TEMPERATURE: 97.8 F | RESPIRATION RATE: 16 BRPM | WEIGHT: 142 LBS

## 2019-05-28 PROCEDURE — 99024 POSTOP FOLLOW-UP VISIT: CPT

## 2019-05-28 RX ORDER — OXYCODONE 5 MG/1
5 TABLET ORAL AT BEDTIME
Qty: 10 | Refills: 0 | Status: DISCONTINUED | COMMUNITY
Start: 2019-04-19 | End: 2019-05-28

## 2019-05-28 RX ORDER — MELOXICAM 15 MG/1
TABLET ORAL
Refills: 0 | Status: DISCONTINUED | COMMUNITY
End: 2019-05-28

## 2019-05-28 RX ORDER — HYDROCORTISONE 25 MG/G
2.5 CREAM TOPICAL
Qty: 28.35 | Refills: 3 | Status: DISCONTINUED | COMMUNITY
Start: 2018-07-11 | End: 2019-05-28

## 2019-05-28 RX ORDER — ETODOLAC 300 MG/1
300 CAPSULE ORAL
Refills: 0 | Status: DISCONTINUED | COMMUNITY
End: 2019-05-28

## 2019-05-28 RX ORDER — OMEPRAZOLE 20 MG/1
20 CAPSULE, DELAYED RELEASE ORAL
Refills: 0 | Status: DISCONTINUED | COMMUNITY
End: 2019-05-28

## 2019-05-28 NOTE — REASON FOR VISIT
[Follow-Up: _____] : a [unfilled] follow-up visit [Other: _____] : [unfilled] [FreeTextEntry1] : ACDF Six week follow up

## 2019-05-28 NOTE — REASON FOR VISIT
[Follow-Up: _____] : a [unfilled] follow-up visit [Other: _____] : [unfilled] [FreeTextEntry1] : 58 Year old male who presents with a history of neck and arm radicular pain.  He is now six weeks post op from a ACDF.   He reports post op pain in his shoulders bilaterally .

## 2019-05-28 NOTE — ASSESSMENT
[FreeTextEntry1] : Assess;\par Cervical Radiculopathy \par S/P ACDF - six week follow up \par \par \par PLAN:\par Cervical X rays in three month follow up \par Return in July 2019 with x-rays\par PT initiate \par NO Bending, Lifting, Stretching

## 2019-07-23 ENCOUNTER — APPOINTMENT (OUTPATIENT)
Dept: SPINE | Facility: CLINIC | Age: 59
End: 2019-07-23
Payer: MEDICAID

## 2019-07-23 VITALS
HEIGHT: 65 IN | SYSTOLIC BLOOD PRESSURE: 120 MMHG | BODY MASS INDEX: 25.33 KG/M2 | DIASTOLIC BLOOD PRESSURE: 79 MMHG | WEIGHT: 152 LBS | HEART RATE: 77 BPM

## 2019-07-23 PROCEDURE — 99213 OFFICE O/P EST LOW 20 MIN: CPT

## 2019-07-25 NOTE — REASON FOR VISIT
[Follow-Up: _____] : a [unfilled] follow-up visit [Other: _____] : [unfilled] [FreeTextEntry1] : 59  year old being seen for three month followup after ACDF for cervical radiculopathy

## 2019-07-25 NOTE — ASSESSMENT
[FreeTextEntry1] : Assess:\par Cervical radciuloapthy\par S/P ACDF\par \par \par PLAN:\par Return in [place] 2019\par Cervical xrays in [place] 2019\par No B L T

## 2019-09-09 NOTE — PATIENT PROFILE ADULT - PATIENT/CAREGIVER ACCEPTED INTERPRETER SERVICES
Quality 110: Preventive Care And Screening: Influenza Immunization: Influenza Immunization not Administered because Patient Refused. Quality 130: Documentation Of Current Medications In The Medical Record: Current Medications Documented Quality 226: Preventive Care And Screening: Tobacco Use: Screening And Cessation Intervention: Patient screened for tobacco use and is an ex/non-smoker Detail Level: Detailed yes

## 2019-10-29 ENCOUNTER — APPOINTMENT (OUTPATIENT)
Dept: SPINE | Facility: CLINIC | Age: 59
End: 2019-10-29
Payer: MEDICAID

## 2019-10-29 VITALS
BODY MASS INDEX: 26.29 KG/M2 | HEIGHT: 64 IN | WEIGHT: 154 LBS | DIASTOLIC BLOOD PRESSURE: 73 MMHG | SYSTOLIC BLOOD PRESSURE: 120 MMHG | HEART RATE: 101 BPM

## 2019-10-29 PROCEDURE — 99213 OFFICE O/P EST LOW 20 MIN: CPT

## 2019-10-29 NOTE — REASON FOR VISIT
[FreeTextEntry1] : 59 year old male who presents with a history of  cervical radiculoapthy and underwent a ACDF sixmonth sago.  he reports his pain is

## 2019-10-31 PROCEDURE — 80048 BASIC METABOLIC PNL TOTAL CA: CPT

## 2019-10-31 PROCEDURE — 86901 BLOOD TYPING SEROLOGIC RH(D): CPT

## 2019-10-31 PROCEDURE — 72125 CT NECK SPINE W/O DYE: CPT

## 2019-10-31 PROCEDURE — 97116 GAIT TRAINING THERAPY: CPT

## 2019-10-31 PROCEDURE — C1769: CPT

## 2019-10-31 PROCEDURE — 71045 X-RAY EXAM CHEST 1 VIEW: CPT

## 2019-10-31 PROCEDURE — C1889: CPT

## 2019-10-31 PROCEDURE — 94640 AIRWAY INHALATION TREATMENT: CPT

## 2019-10-31 PROCEDURE — 85027 COMPLETE CBC AUTOMATED: CPT

## 2019-10-31 PROCEDURE — 97161 PT EVAL LOW COMPLEX 20 MIN: CPT

## 2019-10-31 PROCEDURE — 71275 CT ANGIOGRAPHY CHEST: CPT

## 2019-10-31 PROCEDURE — 86900 BLOOD TYPING SEROLOGIC ABO: CPT

## 2019-10-31 PROCEDURE — C1713: CPT

## 2019-10-31 PROCEDURE — 76000 FLUOROSCOPY <1 HR PHYS/QHP: CPT

## 2021-05-28 NOTE — PHYSICAL THERAPY INITIAL EVALUATION ADULT - BED MOBILITY TRAINING, PT EVAL
Patient reports improved numbness to left 3rd, 4th and 5th digits.  Patient has improved speech per son.     Pt will perform all bed mobility in 2 weeks independently

## 2021-12-30 NOTE — H&P PST ADULT - NS PRO REFERRAL CMGT
Spironolactone Counseling: Patient advised regarding risks of diarrhea, abdominal pain, hyperkalemia, birth defects (for female patients), liver toxicity and renal toxicity. The patient may need blood work to monitor liver and kidney function and potassium levels while on therapy. The patient verbalized understanding of the proper use and possible adverse effects of spironolactone.  All of the patient's questions and concerns were addressed. Isotretinoin Counseling: Patient should get monthly blood tests, not donate blood, not drive at night if vision affected, not share medication, and not undergo elective surgery for 6 months after tx completed. Side effects reviewed, pt to contact office should one occur. Topical Clindamycin Pregnancy And Lactation Text: This medication is Pregnancy Category B and is considered safe during pregnancy. It is unknown if it is excreted in breast milk. Azithromycin Pregnancy And Lactation Text: This medication is considered safe during pregnancy and is also secreted in breast milk. Birth Control Pills Pregnancy And Lactation Text: This medication should be avoided if pregnant and for the first 30 days post-partum. Minocycline Counseling: Patient advised regarding possible photosensitivity and discoloration of the teeth, skin, lips, tongue and gums.  Patient instructed to avoid sunlight, if possible.  When exposed to sunlight, patients should wear protective clothing, sunglasses, and sunscreen.  The patient was instructed to call the office immediately if the following severe adverse effects occur:  hearing changes, easy bruising/bleeding, severe headache, or vision changes.  The patient verbalized understanding of the proper use and possible adverse effects of minocycline.  All of the patient's questions and concerns were addressed. Detail Level: Detailed Tetracycline Pregnancy And Lactation Text: This medication is Pregnancy Category D and not consider safe during pregnancy. It is also excreted in breast milk. Topical Retinoid Pregnancy And Lactation Text: This medication is Pregnancy Category C. It is unknown if this medication is excreted in breast milk. Birth Control Pills Counseling: Birth Control Pill Counseling: I discussed with the patient the potential side effects of OCPs including but not limited to increased risk of stroke, heart attack, thrombophlebitis, deep venous thrombosis, hepatic adenomas, breast changes, GI upset, headaches, and depression.  The patient verbalized understanding of the proper use and possible adverse effects of OCPs. All of the patient's questions and concerns were addressed. Doxycycline Counseling:  Patient counseled regarding possible photosensitivity and increased risk for sunburn.  Patient instructed to avoid sunlight, if possible.  When exposed to sunlight, patients should wear protective clothing, sunglasses, and sunscreen.  The patient was instructed to call the office immediately if the following severe adverse effects occur:  hearing changes, easy bruising/bleeding, severe headache, or vision changes.  The patient verbalized understanding of the proper use and possible adverse effects of doxycycline.  All of the patient's questions and concerns were addressed. Erythromycin Pregnancy And Lactation Text: This medication is Pregnancy Category B and is considered safe during pregnancy. It is also excreted in breast milk. Topical Retinoid counseling:  Patient advised to apply a pea-sized amount only at bedtime and wait 30 minutes after washing their face before applying.  If too drying, patient may add a non-comedogenic moisturizer. The patient verbalized understanding of the proper use and possible adverse effects of retinoids.  All of the patient's questions and concerns were addressed. High Dose Vitamin A Pregnancy And Lactation Text: High dose vitamin A therapy is contraindicated during pregnancy and breast feeding. Azithromycin Counseling:  I discussed with the patient the risks of azithromycin including but not limited to GI upset, allergic reaction, drug rash, diarrhea, and yeast infections. Topical Clindamycin Counseling: Patient counseled that this medication may cause skin irritation or allergic reactions.  In the event of skin irritation, the patient was advised to reduce the amount of the drug applied or use it less frequently.   The patient verbalized understanding of the proper use and possible adverse effects of clindamycin.  All of the patient's questions and concerns were addressed. Dapsone Pregnancy And Lactation Text: This medication is Pregnancy Category C and is not considered safe during pregnancy or breast feeding. Tetracycline Counseling: Patient counseled regarding possible photosensitivity and increased risk for sunburn.  Patient instructed to avoid sunlight, if possible.  When exposed to sunlight, patients should wear protective clothing, sunglasses, and sunscreen.  The patient was instructed to call the office immediately if the following severe adverse effects occur:  hearing changes, easy bruising/bleeding, severe headache, or vision changes.  The patient verbalized understanding of the proper use and possible adverse effects of tetracycline.  All of the patient's questions and concerns were addressed. Patient understands to avoid pregnancy while on therapy due to potential birth defects. Tazorac Pregnancy And Lactation Text: This medication is not safe during pregnancy. It is unknown if this medication is excreted in breast milk. Bactrim Pregnancy And Lactation Text: This medication is Pregnancy Category D and is known to cause fetal risk.  It is also excreted in breast milk. Sarecycline Counseling: Patient advised regarding possible photosensitivity and discoloration of the teeth, skin, lips, tongue and gums.  Patient instructed to avoid sunlight, if possible.  When exposed to sunlight, patients should wear protective clothing, sunglasses, and sunscreen.  The patient was instructed to call the office immediately if the following severe adverse effects occur:  hearing changes, easy bruising/bleeding, severe headache, or vision changes.  The patient verbalized understanding of the proper use and possible adverse effects of sarecycline.  All of the patient's questions and concerns were addressed. High Dose Vitamin A Counseling: Side effects reviewed, pt to contact office should one occur. Topical Sulfur Applications Pregnancy And Lactation Text: This medication is Pregnancy Category C and has an unknown safety profile during pregnancy. It is unknown if this topical medication is excreted in breast milk. Include Pregnancy/Lactation Warning?: No Benzoyl Peroxide Pregnancy And Lactation Text: This medication is Pregnancy Category C. It is unknown if benzoyl peroxide is excreted in breast milk. Erythromycin Counseling:  I discussed with the patient the risks of erythromycin including but not limited to GI upset, allergic reaction, drug rash, diarrhea, increase in liver enzymes, and yeast infections. Dapsone Counseling: I discussed with the patient the risks of dapsone including but not limited to hemolytic anemia, agranulocytosis, rashes, methemoglobinemia, kidney failure, peripheral neuropathy, headaches, GI upset, and liver toxicity.  Patients who start dapsone require monitoring including baseline LFTs and weekly CBCs for the first month, then every month thereafter.  The patient verbalized understanding of the proper use and possible adverse effects of dapsone.  All of the patient's questions and concerns were addressed. Spironolactone Pregnancy And Lactation Text: This medication can cause feminization of the male fetus and should be avoided during pregnancy. The active metabolite is also found in breast milk. Tazorac Counseling:  Patient advised that medication is irritating and drying.  Patient may need to apply sparingly and wash off after an hour before eventually leaving it on overnight.  The patient verbalized understanding of the proper use and possible adverse effects of tazorac.  All of the patient's questions and concerns were addressed. Benzoyl Peroxide Counseling: Patient counseled that medicine may cause skin irritation and bleach clothing.  In the event of skin irritation, the patient was advised to reduce the amount of the drug applied or use it less frequently.   The patient verbalized understanding of the proper use and possible adverse effects of benzoyl peroxide.  All of the patient's questions and concerns were addressed. Bactrim Counseling:  I discussed with the patient the risks of sulfa antibiotics including but not limited to GI upset, allergic reaction, drug rash, diarrhea, dizziness, photosensitivity, and yeast infections.  Rarely, more serious reactions can occur including but not limited to aplastic anemia, agranulocytosis, methemoglobinemia, blood dyscrasias, liver or kidney failure, lung infiltrates or desquamative/blistering drug rashes. Topical Sulfur Applications Counseling: Topical Sulfur Counseling: Patient counseled that this medication may cause skin irritation or allergic reactions.  In the event of skin irritation, the patient was advised to reduce the amount of the drug applied or use it less frequently.   The patient verbalized understanding of the proper use and possible adverse effects of topical sulfur application.  All of the patient's questions and concerns were addressed. Isotretinoin Pregnancy And Lactation Text: This medication is Pregnancy Category X and is considered extremely dangerous during pregnancy. It is unknown if it is excreted in breast milk. Doxycycline Pregnancy And Lactation Text: This medication is Pregnancy Category D and not consider safe during pregnancy. It is also excreted in breast milk but is considered safe for shorter treatment courses. None

## 2022-08-10 NOTE — H&P PST ADULT - LYMPH NODES
Jodee  was seen and treated in our emergency department on 8/10/2022. He may return to work on 08/13/2022. If you have any questions or concerns, please don't hesitate to call.       CHANTELLE Sparrow
detailed exam

## 2022-09-22 ENCOUNTER — FORM ENCOUNTER (OUTPATIENT)
Age: 62
End: 2022-09-22

## 2023-08-21 NOTE — REVIEW OF SYSTEMS
From: Darwin Byrne  To: Dr. Yadira Lopez  Sent: 8/17/2023 2:47 PM EDT  Subject: Prescriptions     Hi, I need refills for Metformin, Allopurinol, Losartin, Atorvistatin, and the 150 Levothyroxine . I am now taking three a week of those instead of two, so can you send a new script for the correct amount of pills. I am coming to see you on the 31st, but I will be out of some of these before then. Please send to [facility] on [facility].  Thanks [Arm Weakness] : arm weakness [Hand Weakness] :  hand weakness [Joint Pain] : joint pain [Itching] : itching [Negative] : Heme/Lymph [de-identified] : neck and arm pain  [FreeTextEntry3] : blurry vision

## 2023-09-16 NOTE — PRE-OP CHECKLIST - DNR CLARIFICATION FORM COMPLETED
Received call at 11:50 AM regarding patient from [facility] ED Dr. Graciela Pritchard. He presented to [facility] ED today with rash across abdomen. Rash described as petechial in nature. CBC obtained at [facility] showing:  Platelet count 30,865  Hgb 6.9. Hct 20.6. MCV 88.0   WBC count 64.9. Ref Range & Units Today Comments   WBC (WHITE BLOOD COUNT) 4.2 - 9.1 10^3/uL 64.9 High Panic   Critical result called to Jared Lamar at ER by AW on 9/16/2023 11:26. Result   read back? Yes (WBC, HGB, HCT, PLT)   RBC 4.63 - 6.08 10^6/uL 2.34 Low      HEMOGLOBIN (HGB) 13.7 - 17.5 g/dL 6.9 Low Panic   Critical result called to Jared Lamar at ER by AW on 9/16/2023 11:26. Result   read back? Yes (WBC, HGB, HCT, PLT)   HEMATOCRIT (HCT) 40.1 - 51.0 % 20.6 Low Panic   Critical result called to Jared Lamar at ER by AW on 9/16/2023 11:26. Result   read back? Yes (WBC, HGB, HCT, PLT)   MEAN CELL VOLUME 79.0 - 92.2 fL 88.0     Mean Cell HGB 25.7 - 32.2 pg 29.5     MEAN CELL HGB CONCENTRATION 32.3 - 36.5 % 33.5     RBC DISTRIBUTION 11.6 - 14.4 % 15.7 High      PLATELET COUNT 958 - 337 10^3/uL 10 Low Panic          Discussed recommendation for transfusion of PRBCs and platelets. Discussed PRBCs and platelets should be irradiated, leukoreduced. Dr. Owens Argue unsure of blood products locally could be irradiated and patient may need transferred if irradiated products unavailable. Discussed patient has been transferred to [facility] in past with drop in counts, but please ask patient where he would like transferred if needed as concern for [facility] copay per documentation on 9/11/23. The patient is scheduled to come to [address] [place] [address] on 9/18/23 for possible chemotherapy with decitabine (held on 9/11/23 per Dr. Jessee Nielson note. He received 1 unit of PRBCs 9/11/23 - CBC on 9/11/23 WBC count 52.9, Hgb 7.3, Hct 23.2, platelet count 69,666).  Will forward above information to [facility] team. n/a

## 2025-06-26 ENCOUNTER — APPOINTMENT (OUTPATIENT)
Dept: GASTROENTEROLOGY | Facility: CLINIC | Age: 65
End: 2025-06-26